# Patient Record
Sex: FEMALE | Race: ASIAN | NOT HISPANIC OR LATINO
[De-identification: names, ages, dates, MRNs, and addresses within clinical notes are randomized per-mention and may not be internally consistent; named-entity substitution may affect disease eponyms.]

---

## 2018-11-01 PROBLEM — Z00.00 ENCOUNTER FOR PREVENTIVE HEALTH EXAMINATION: Status: ACTIVE | Noted: 2018-11-01

## 2018-11-09 ENCOUNTER — APPOINTMENT (OUTPATIENT)
Dept: ORTHOPEDIC SURGERY | Facility: CLINIC | Age: 63
End: 2018-11-09
Payer: MEDICAID

## 2018-11-09 VITALS
HEART RATE: 53 BPM | HEIGHT: 57 IN | BODY MASS INDEX: 28.05 KG/M2 | DIASTOLIC BLOOD PRESSURE: 84 MMHG | WEIGHT: 130 LBS | SYSTOLIC BLOOD PRESSURE: 177 MMHG

## 2018-11-09 PROCEDURE — 20610 DRAIN/INJ JOINT/BURSA W/O US: CPT | Mod: 50

## 2018-11-09 PROCEDURE — 73562 X-RAY EXAM OF KNEE 3: CPT | Mod: 50

## 2018-11-09 PROCEDURE — 99204 OFFICE O/P NEW MOD 45 MIN: CPT | Mod: 25

## 2018-11-09 RX ORDER — FLUOXETINE HYDROCHLORIDE 40 MG/1
CAPSULE ORAL
Refills: 0 | Status: ACTIVE | COMMUNITY

## 2018-11-15 ENCOUNTER — APPOINTMENT (OUTPATIENT)
Dept: MRI IMAGING | Facility: CLINIC | Age: 63
End: 2018-11-15
Payer: MEDICAID

## 2018-11-15 ENCOUNTER — OUTPATIENT (OUTPATIENT)
Dept: OUTPATIENT SERVICES | Facility: HOSPITAL | Age: 63
LOS: 1 days | End: 2018-11-15

## 2018-11-15 DIAGNOSIS — M17.11 UNILATERAL PRIMARY OSTEOARTHRITIS, RIGHT KNEE: ICD-10-CM

## 2018-11-15 PROCEDURE — 73721 MRI JNT OF LWR EXTRE W/O DYE: CPT | Mod: 26,RT

## 2018-11-16 ENCOUNTER — APPOINTMENT (OUTPATIENT)
Dept: ORTHOPEDIC SURGERY | Facility: CLINIC | Age: 63
End: 2018-11-16
Payer: MEDICAID

## 2018-11-16 VITALS
BODY MASS INDEX: 28.05 KG/M2 | WEIGHT: 130 LBS | HEIGHT: 57 IN | SYSTOLIC BLOOD PRESSURE: 140 MMHG | DIASTOLIC BLOOD PRESSURE: 80 MMHG | HEART RATE: 64 BPM

## 2018-11-16 DIAGNOSIS — Z78.9 OTHER SPECIFIED HEALTH STATUS: ICD-10-CM

## 2018-11-16 DIAGNOSIS — M47.816 SPONDYLOSIS W/OUT MYELOPATHY OR RADICULOPATHY, LUMBAR REGION: ICD-10-CM

## 2018-11-16 DIAGNOSIS — M48.061 SPINAL STENOSIS, LUMBAR REGION WITHOUT NEUROGENIC CLAUDICATION: ICD-10-CM

## 2018-11-16 PROCEDURE — 72100 X-RAY EXAM L-S SPINE 2/3 VWS: CPT

## 2018-11-16 PROCEDURE — 99214 OFFICE O/P EST MOD 30 MIN: CPT

## 2018-11-16 RX ORDER — IBUPROFEN 600 MG/1
600 TABLET, FILM COATED ORAL TWICE DAILY
Qty: 30 | Refills: 0 | Status: ACTIVE | COMMUNITY
Start: 2018-11-16 | End: 1900-01-01

## 2018-11-16 RX ORDER — METHYLPREDNISOLONE 4 MG/1
4 TABLET ORAL
Qty: 1 | Refills: 0 | Status: ACTIVE | COMMUNITY
Start: 2018-11-16 | End: 1900-01-01

## 2018-11-21 ENCOUNTER — APPOINTMENT (OUTPATIENT)
Dept: INTERNAL MEDICINE | Facility: CLINIC | Age: 63
End: 2018-11-21

## 2019-04-17 ENCOUNTER — APPOINTMENT (OUTPATIENT)
Dept: ORTHOPEDIC SURGERY | Facility: CLINIC | Age: 64
End: 2019-04-17

## 2019-05-08 ENCOUNTER — APPOINTMENT (OUTPATIENT)
Dept: ORTHOPEDIC SURGERY | Facility: CLINIC | Age: 64
End: 2019-05-08
Payer: MEDICAID

## 2019-05-08 VITALS
DIASTOLIC BLOOD PRESSURE: 76 MMHG | HEIGHT: 57 IN | HEART RATE: 70 BPM | BODY MASS INDEX: 28.05 KG/M2 | WEIGHT: 130 LBS | SYSTOLIC BLOOD PRESSURE: 152 MMHG

## 2019-05-08 PROCEDURE — 99214 OFFICE O/P EST MOD 30 MIN: CPT

## 2019-05-08 NOTE — PHYSICAL EXAM
[LE] : Sensory: Intact in bilateral lower extremities [ALL] : dorsalis pedis, posterior tibial, femoral, popliteal, and radial 2+ and symmetric bilaterally [de-identified] : GENERAL APPEARANCE: Well nourished and hydrated, pleasant, alert, and oriented x 3. Appears their stated age. \par HEENT: Normocephalic, extraocular eye motion intact. Nasal septum midline. Oral cavity clear. External auditory canal clear. \par RESPIRATORY: Breath sounds clear and audible in all lobes. No wheezing, No accessory muscle use.\par CARDIOVASCULAR: No apparent abnormalities. No lower leg edema. No varicosities. Pedal pulses are palpable.\par NEUROLOGIC: Sensation is normal, no muscle weakness in the upper or lower extremities.\par DERMATOLOGIC: No apparent skin lesions, moist, warm, no rash.\par SPINE: Cervical spine appears normal and moves freely; thoracic spine appears normal and moves freely; lumbosacral spine appears normal and moves freely, normal, nontender.\par MUSCULOSKELETAL: Hands, wrists, and elbows are normal and move freely, shoulders are normal and move freely.  [de-identified] : Bilateral knee examination demonstrates medial joint line tenderness, 0-120 FROM, slight varus alignment, crepitus with ROM.\par  [de-identified] : MRI of the right knee performed 11/15/2018 at [facility] shows moderate tricompartmental osteoarthritis. Full-thickness tear of the posterior root insertion of the medial meniscus.

## 2019-05-08 NOTE — HISTORY OF PRESENT ILLNESS
[Pain Location] : pain [Worsening] : worsening [de-identified] : Patient is a 63 year old female who presents for follow evaluation of bilateral knee pain, right worse than left today, but this changes daily. She reports that her pain has worsened over the last few days. Her pain is localized to the anterior aspect of the knees. She notes crepitus with motion as well. She is using a brace and rest for relief, with mild relief. She was seen in November 2018, had cortisone injections which she states provided mild relief. After her last visit she was sent for MRI of the right knee. [6] : an average pain level of 6/10 [4] : a minimum pain level of 4/10 [8] : a maximum pain level of 8/10 [Walking] : walking [Daily] : ~He/She~ states the symptoms seem to be occuring daily [Bending] : worsened by bending [Direct Pressure] : worsened by direct pressure [Knee Flexion] : worsened with knee flexion [Knee Extension] : worsened with knee extension [Exercise Regimen] : relieved by exercise regimen [NSAIDs] : relieved by nonsteroidal anti-inflammatory drugs [Recumbency] : relieved by recumbency [Rest] : relieved by rest [Ataxia] : no ataxia [Loss of Dexterity] : good dexterity [Incontinence] : no incontinence [Urinary Ret.] : no urinary retention

## 2019-05-08 NOTE — ADDENDUM
[FreeTextEntry1] : I, Conor Vick, acted solely as a scribe for Dr. Kev Gomez on this date 05/08/2019.

## 2019-05-08 NOTE — DISCUSSION/SUMMARY
[Medication Risks Reviewed] : Medication risks reviewed [Surgical risks reviewed] : Surgical risks reviewed [de-identified] : 63 year old female presents with severe medial compartment osteoarthritis of the left knee and moderate tricompartmental osteoarthritis of the right knee. Her right knee MRI confirmed moderate osteoarthritis and full-thickness tear of the posterior root insertion of the medial meniscus. We discussed the nature of the condition and the treatment options. Based on the x-ray and physical exam, she is a candidate for left TKA. I believe this would be the best option for long term relief. She wishes to pursue left TKA, and we discussed the surgery in detail.\par \par She can schedule left TKA today in office, or can call to schedule.\par \par A knee replacement means resurfacing of all 3 surfaces of the patella, the femur, and tibia with metal and plastic parts. The prosthetic parts are usually cemented into position and well outpatient range of motion from full extension to about 120° of flexion. The postoperative motion, however, is determined by multiple factors, the most important of which is preoperative motion. In general, the better motion preoperatively, the better the motion postoperatively. The operation, pending medical clearance, gently requires hospitalization of 3-4 days for one knee, 5-7 days for bilateral knee replacements. In general, we prefer to perform the procedure under spinal anesthesia with femoral nerve block and occasional single shot sciatic nerve block. We may ask a patient to give 2 units of blood for bilateral total knee arthroplasties, for one knee we institute a normogram which may include administration of preoperative Procrit. The operative procedure takes probably 1-2 hours. The operation requires a straight incision anywhere from 5-7 inches down from the knee. Postoperatively, the patient is positioned in a continuous passive motion machine within the first 24 hours and is walking the day after surgery. The first couple days are very painful and the pain medication will alleviate, but not eliminate the pain. The patient must really push hard to get range of motion. Our goal for having a person go home as that the range of motion is approximately 0-90° of flexion and that they can walk with a walker or cane. A walking aid is to be dispensed once the patient is secure enough. In general there, there is no cast or brace required with routine knee replacement. In the long term, we do not encourage our patients to run for the sake of running, although pending their preoperative status, we often allow patient to play doubles tennis or comparative activities. We also allowed them to do gentle intermediate downhill skiing if they are truly an expert skier. Biking is encouraged as well swimming. The followup periods are usually 3 weeks, 6 weeks, 3 months, and yearly intervals. Potential complications with total knee replacement included anesthetic complications and death, infection around 1%, nerve damage, by which means peroneal nerve palsy, footdrop or flapping foot with ambulation. This is particularly more apt to occur in the patient with a valgus or knock-knee deformity. The incidence can be quite high in this particular patient population. There will be areas of skin numbness, but this is not an untoward effect nor do we consider it a complication. Other potential complications include dislocation of the patella component, usually less than 2%; loosening of the tibial or femoral component is much more infrequent. Most often this occurs with infection or long-term use. Patient of extreme risk including markedly overweight patient's may be more prone to prosthetic wear. Major blood vessel damage is also extremely rare. Directly because of the anatomic proximity of the popliteal artery this could be lacerated with subsequent repair required. Be it unlikely, disruption of the popliteal artery could theoretically result in amputation. Similarly, infection could theoretically result in amputation if one were to grow out of an organism that cannot be controlled with antibiotics. General medical complications include phlebitis, for which we would prophylactically anticoagulate patients, but could still occur, and fatal pulmonary embolus which has been reported. Cardiovascular problems, such as heart attack or ischemia are always a concern with such hemodynamic changes in the blood vascular system. Other General complications are rare, but anything medicine could theoretically happen. I think the patient understands the risk benefit ratio of total knee replacement and will think about whether there like to pursue with an operation or nonoperative treatment program.

## 2019-06-21 ENCOUNTER — APPOINTMENT (OUTPATIENT)
Dept: ORTHOPEDIC SURGERY | Facility: CLINIC | Age: 64
End: 2019-06-21
Payer: MEDICAID

## 2019-06-21 VITALS
HEIGHT: 57 IN | BODY MASS INDEX: 28.05 KG/M2 | WEIGHT: 130 LBS | DIASTOLIC BLOOD PRESSURE: 82 MMHG | SYSTOLIC BLOOD PRESSURE: 148 MMHG | HEART RATE: 72 BPM

## 2019-06-21 DIAGNOSIS — M17.11 UNILATERAL PRIMARY OSTEOARTHRITIS, RIGHT KNEE: ICD-10-CM

## 2019-06-21 DIAGNOSIS — M17.12 UNILATERAL PRIMARY OSTEOARTHRITIS, LEFT KNEE: ICD-10-CM

## 2019-06-21 PROCEDURE — 73562 X-RAY EXAM OF KNEE 3: CPT | Mod: LT

## 2019-06-21 PROCEDURE — 99204 OFFICE O/P NEW MOD 45 MIN: CPT

## 2019-06-21 RX ORDER — ACETAMINOPHEN AND CODEINE 300; 30 MG/1; MG/1
300-30 TABLET ORAL
Qty: 30 | Refills: 0 | Status: ACTIVE | COMMUNITY
Start: 2019-06-06

## 2019-06-21 RX ORDER — CYCLOBENZAPRINE HYDROCHLORIDE 5 MG/1
5 TABLET, FILM COATED ORAL
Qty: 30 | Refills: 0 | Status: ACTIVE | COMMUNITY
Start: 2019-06-06

## 2019-06-21 RX ORDER — VENLAFAXINE HYDROCHLORIDE 75 MG/1
75 CAPSULE, EXTENDED RELEASE ORAL
Qty: 30 | Refills: 0 | Status: ACTIVE | COMMUNITY
Start: 2019-06-02

## 2019-06-21 NOTE — DISCUSSION/SUMMARY
[Medication Risks Reviewed] : Medication risks reviewed [Surgical risks reviewed] : Surgical risks reviewed [de-identified] : After a thorough history full examination and review of x-rays. Was explained to the patient that she does suffer from severe DJD of both the left and right knee. She was explained the different modalities of treatment which include conservative management versus surgical intervention. However as she has had no success with conservative management. It is recommended that she undergo a left total knee replacement. This is the surgery that will be performed first as is the knee that she is complaining of more pain. She was explained the risks benefits pros and cons of surgery as well as alternatives. She was when it is no guarantee for complete relief following the procedure. It was also explained the actual procedure itself in great detail. I reviewed her plan of care as well as a model of the total knee implant to the one over use for the total knee replacement. I also explained about the postoperative management both in the hospital and upon discharge. As the patient also has some osteoarthritic changes in the right knee. Patient was offered a Visco supplementation injection can be performed at the date of surgery. She was in the risk benefits pros and cons of injection as well as alternatives to\par This is not appear over the osteoarthritis. If this patient does choose to undergo surgery. The patient would need to see his primary care physician for presurgical clearance. The patient agreed to the plan of care as well as the use of implants the left total knee replacement. And also agreed to be Visco supplementation injection. Should the patient be cleared for surgery. The patient will then speak to her surgical coordinator to arrange a date of surgery.

## 2019-06-21 NOTE — PHYSICAL EXAM
[Normal] : Gait: normal [LE] : Sensory: Intact in bilateral lower extremities [ALL] : dorsalis pedis, posterior tibial, femoral, popliteal, and radial 2+ and symmetric bilaterally [de-identified] : X-rays of both knees. Left is worse than right. She'll osteoarthritic changes in all 3 compartments. There is a flattening of the medial as well as lateral femoral condyles with marked osteophyte formation in all 3 compartments. There is also a narrowing of the joint space in particular the medial femoral tibial compartment worse in the left knee. There is no evidence of any fracture or dislocation noted. [de-identified] : On physical examination of both the left and right knee. Left is worse than the right. Skin is clean dry and intact without erythema swelling or heat noted. There is diffuse tenderness but really in the patellofemoral as well as medial femoral tibial compartment of both knees. The overall alignment is with the neutral position. The patient has excellent range of motion both passively and actively. She is able to bend both knees this one 25°. With full extension noted. There is no evidence of ligamentous laxity. As this was tested with anterior posterior drawer test and varus stress testing. There is no evidence of any muscle atrophy. No evidence of any motor or sensory deficit. Patient does have good distal pulses and no calf tenderness.

## 2019-06-21 NOTE — HISTORY OF PRESENT ILLNESS
[4] : an average pain level of 4/10 [Stable] : stable [de-identified] : The patient is a 63-year-old female who presents today for an evaluation of both the left and right knee. She describes the pain as a constant pain which is worse with standing and walking. She states the left is worse than the right. She tried conservative management including a good exercise program, NSAIDs as well as cortisone injections. She states that she did not receive any improvement except for a mild relief with the cortisone. She states that this lasted less than a month and the pain returned. She did seek medical attention in this office in the past it was explained she does have some osteoarthritic changes. She also follow with  that would advise her to undergo bilateral total knee replacements due to the arthritis. She states that as time has progressed she felt an increase in pain. Therefore she returns today for evaluation and management regarding both knees. Left knee is worse than the right. [Bending] : worsened by bending [Direct Pressure] : worsened by direct pressure [Walking] : worsened by walking

## 2019-06-26 ENCOUNTER — CHART COPY (OUTPATIENT)
Age: 64
End: 2019-06-26

## 2019-06-26 RX ORDER — HYALURONATE SOD, CROSS-LINKED 30 MG/3 ML
30 SYRINGE (ML) INTRAARTICULAR
Qty: 1 | Refills: 0 | Status: ACTIVE | OUTPATIENT
Start: 2019-06-26

## 2019-06-27 ENCOUNTER — OUTPATIENT (OUTPATIENT)
Dept: OUTPATIENT SERVICES | Facility: HOSPITAL | Age: 64
LOS: 1 days | End: 2019-06-27
Payer: MEDICAID

## 2019-06-27 VITALS
HEIGHT: 58 IN | SYSTOLIC BLOOD PRESSURE: 137 MMHG | DIASTOLIC BLOOD PRESSURE: 70 MMHG | HEART RATE: 65 BPM | RESPIRATION RATE: 15 BRPM | TEMPERATURE: 98 F | OXYGEN SATURATION: 96 % | WEIGHT: 128.09 LBS

## 2019-06-27 DIAGNOSIS — M17.12 UNILATERAL PRIMARY OSTEOARTHRITIS, LEFT KNEE: ICD-10-CM

## 2019-06-27 DIAGNOSIS — Z01.818 ENCOUNTER FOR OTHER PREPROCEDURAL EXAMINATION: ICD-10-CM

## 2019-06-27 DIAGNOSIS — M17.11 UNILATERAL PRIMARY OSTEOARTHRITIS, RIGHT KNEE: ICD-10-CM

## 2019-06-27 LAB
ALBUMIN SERPL ELPH-MCNC: 3.9 G/DL — SIGNIFICANT CHANGE UP (ref 3.3–5)
ALP SERPL-CCNC: 80 U/L — SIGNIFICANT CHANGE UP (ref 30–120)
ALT FLD-CCNC: 25 U/L DA — SIGNIFICANT CHANGE UP (ref 10–60)
ANION GAP SERPL CALC-SCNC: 4 MMOL/L — LOW (ref 5–17)
APTT BLD: 31.3 SEC — SIGNIFICANT CHANGE UP (ref 28.5–37)
AST SERPL-CCNC: 21 U/L — SIGNIFICANT CHANGE UP (ref 10–40)
BILIRUB SERPL-MCNC: 0.3 MG/DL — SIGNIFICANT CHANGE UP (ref 0.2–1.2)
BLD GP AB SCN SERPL QL: SIGNIFICANT CHANGE UP
BUN SERPL-MCNC: 11 MG/DL — SIGNIFICANT CHANGE UP (ref 7–23)
CALCIUM SERPL-MCNC: 9.2 MG/DL — SIGNIFICANT CHANGE UP (ref 8.4–10.5)
CHLORIDE SERPL-SCNC: 104 MMOL/L — SIGNIFICANT CHANGE UP (ref 96–108)
CO2 SERPL-SCNC: 33 MMOL/L — HIGH (ref 22–31)
CREAT SERPL-MCNC: 0.73 MG/DL — SIGNIFICANT CHANGE UP (ref 0.5–1.3)
GLUCOSE SERPL-MCNC: 87 MG/DL — SIGNIFICANT CHANGE UP (ref 70–99)
HCT VFR BLD CALC: 40.5 % — SIGNIFICANT CHANGE UP (ref 34.5–45)
HGB BLD-MCNC: 13.9 G/DL — SIGNIFICANT CHANGE UP (ref 11.5–15.5)
INR BLD: 0.94 RATIO — SIGNIFICANT CHANGE UP (ref 0.88–1.16)
MCHC RBC-ENTMCNC: 32.2 PG — SIGNIFICANT CHANGE UP (ref 27–34)
MCHC RBC-ENTMCNC: 34.3 GM/DL — SIGNIFICANT CHANGE UP (ref 32–36)
MCV RBC AUTO: 93.8 FL — SIGNIFICANT CHANGE UP (ref 80–100)
NRBC # BLD: 0 /100 WBCS — SIGNIFICANT CHANGE UP (ref 0–0)
PLATELET # BLD AUTO: 264 K/UL — SIGNIFICANT CHANGE UP (ref 150–400)
POTASSIUM SERPL-MCNC: 4.9 MMOL/L — SIGNIFICANT CHANGE UP (ref 3.5–5.3)
POTASSIUM SERPL-SCNC: 4.9 MMOL/L — SIGNIFICANT CHANGE UP (ref 3.5–5.3)
PROT SERPL-MCNC: 7.7 G/DL — SIGNIFICANT CHANGE UP (ref 6–8.3)
PROTHROM AB SERPL-ACNC: 10.2 SEC — SIGNIFICANT CHANGE UP (ref 10–12.9)
RBC # BLD: 4.32 M/UL — SIGNIFICANT CHANGE UP (ref 3.8–5.2)
RBC # FLD: 12.9 % — SIGNIFICANT CHANGE UP (ref 10.3–14.5)
SODIUM SERPL-SCNC: 141 MMOL/L — SIGNIFICANT CHANGE UP (ref 135–145)
WBC # BLD: 5.11 K/UL — SIGNIFICANT CHANGE UP (ref 3.8–10.5)
WBC # FLD AUTO: 5.11 K/UL — SIGNIFICANT CHANGE UP (ref 3.8–10.5)

## 2019-06-27 PROCEDURE — 85610 PROTHROMBIN TIME: CPT

## 2019-06-27 PROCEDURE — 36415 COLL VENOUS BLD VENIPUNCTURE: CPT

## 2019-06-27 PROCEDURE — 93005 ELECTROCARDIOGRAM TRACING: CPT

## 2019-06-27 PROCEDURE — 87640 STAPH A DNA AMP PROBE: CPT

## 2019-06-27 PROCEDURE — 86850 RBC ANTIBODY SCREEN: CPT

## 2019-06-27 PROCEDURE — 80053 COMPREHEN METABOLIC PANEL: CPT

## 2019-06-27 PROCEDURE — G0463: CPT

## 2019-06-27 PROCEDURE — 93010 ELECTROCARDIOGRAM REPORT: CPT

## 2019-06-27 PROCEDURE — 85027 COMPLETE CBC AUTOMATED: CPT

## 2019-06-27 PROCEDURE — 85730 THROMBOPLASTIN TIME PARTIAL: CPT

## 2019-06-27 PROCEDURE — 87641 MR-STAPH DNA AMP PROBE: CPT

## 2019-06-27 PROCEDURE — 86900 BLOOD TYPING SEROLOGIC ABO: CPT

## 2019-06-27 PROCEDURE — 86901 BLOOD TYPING SEROLOGIC RH(D): CPT

## 2019-06-27 RX ORDER — MUPIROCIN 20 MG/G
1 OINTMENT TOPICAL
Qty: 1 | Refills: 0
Start: 2019-06-27 | End: 2019-07-01

## 2019-06-27 NOTE — H&P PST ADULT - ASSESSMENT
62 yo female is scheduled for left total knee replacement /right knee replacement on 7/17/19 with Dr Palacio on 7/17/19 at [facility].

## 2019-06-27 NOTE — H&P PST ADULT - HISTORY OF PRESENT ILLNESS
64 yo female is scheduled for left total knee replacement / right knee injection on 7/17/19 with Dr Palacio at [facility].  Accompanied by her ffiend today, patient states she has bilateral knee pain with selling for which she has received cortisone injections without relief.  Pain worst with weight bearing and rates 10/10.

## 2019-06-27 NOTE — H&P PST ADULT - NSICDXPROBLEM_GEN_ALL_CORE_FT
PROBLEM DIAGNOSES  Problem: Osteoarthritis of right knee  Assessment and Plan: Left total knee replacement /Right knee injection  Pending medical clearance  Instructions reviewed with patient and friend  best wishes offered    Problem: Osteoarthritis of left knee  Assessment and Plan: see above

## 2019-06-27 NOTE — H&P PST ADULT - MUSCULOSKELETAL
details… detailed exam no joint warmth/no joint erythema/decreased ROM due to pain/no calf tenderness

## 2019-06-27 NOTE — H&P PST ADULT - NSANTHOSAYNRD_GEN_A_CORE
06-Jan-2017 22:02
No. JOHN screening performed.  STOP BANG Legend: 0-2 = LOW Risk; 3-4 = INTERMEDIATE Risk; 5-8 = HIGH Risk

## 2019-06-28 LAB
MRSA PCR RESULT.: SIGNIFICANT CHANGE UP
S AUREUS DNA NOSE QL NAA+PROBE: SIGNIFICANT CHANGE UP

## 2019-07-16 PROBLEM — M17.12 UNILATERAL PRIMARY OSTEOARTHRITIS, LEFT KNEE: Chronic | Status: ACTIVE | Noted: 2019-06-27

## 2019-07-16 PROBLEM — M17.11 UNILATERAL PRIMARY OSTEOARTHRITIS, RIGHT KNEE: Chronic | Status: ACTIVE | Noted: 2019-06-27

## 2019-07-16 PROBLEM — F32.9 MAJOR DEPRESSIVE DISORDER, SINGLE EPISODE, UNSPECIFIED: Chronic | Status: ACTIVE | Noted: 2019-06-27

## 2019-07-16 RX ORDER — MAGNESIUM HYDROXIDE 400 MG/1
30 TABLET, CHEWABLE ORAL DAILY
Refills: 0 | Status: DISCONTINUED | OUTPATIENT
Start: 2019-07-17 | End: 2019-07-22

## 2019-07-16 RX ORDER — SODIUM CHLORIDE 9 MG/ML
1000 INJECTION, SOLUTION INTRAVENOUS
Refills: 0 | Status: DISCONTINUED | OUTPATIENT
Start: 2019-07-17 | End: 2019-07-19

## 2019-07-16 RX ORDER — PANTOPRAZOLE SODIUM 20 MG/1
40 TABLET, DELAYED RELEASE ORAL
Refills: 0 | Status: DISCONTINUED | OUTPATIENT
Start: 2019-07-17 | End: 2019-07-22

## 2019-07-16 RX ORDER — SENNA PLUS 8.6 MG/1
2 TABLET ORAL AT BEDTIME
Refills: 0 | Status: DISCONTINUED | OUTPATIENT
Start: 2019-07-17 | End: 2019-07-22

## 2019-07-16 RX ORDER — POLYETHYLENE GLYCOL 3350 17 G/17G
17 POWDER, FOR SOLUTION ORAL DAILY
Refills: 0 | Status: DISCONTINUED | OUTPATIENT
Start: 2019-07-17 | End: 2019-07-22

## 2019-07-16 RX ORDER — ONDANSETRON 8 MG/1
4 TABLET, FILM COATED ORAL EVERY 6 HOURS
Refills: 0 | Status: DISCONTINUED | OUTPATIENT
Start: 2019-07-17 | End: 2019-07-22

## 2019-07-16 RX ORDER — DOCUSATE SODIUM 100 MG
100 CAPSULE ORAL THREE TIMES A DAY
Refills: 0 | Status: DISCONTINUED | OUTPATIENT
Start: 2019-07-17 | End: 2019-07-22

## 2019-07-17 ENCOUNTER — INPATIENT (INPATIENT)
Facility: HOSPITAL | Age: 64
LOS: 4 days | Discharge: HOME | DRG: 470 | End: 2019-07-22
Attending: ORTHOPAEDIC SURGERY | Admitting: ORTHOPAEDIC SURGERY
Payer: MEDICAID

## 2019-07-17 ENCOUNTER — RESULT REVIEW (OUTPATIENT)
Age: 64
End: 2019-07-17

## 2019-07-17 ENCOUNTER — APPOINTMENT (OUTPATIENT)
Dept: ORTHOPEDIC SURGERY | Facility: HOSPITAL | Age: 64
End: 2019-07-17

## 2019-07-17 VITALS
DIASTOLIC BLOOD PRESSURE: 66 MMHG | HEART RATE: 55 BPM | TEMPERATURE: 98 F | OXYGEN SATURATION: 99 % | RESPIRATION RATE: 25 BRPM | WEIGHT: 131.4 LBS | SYSTOLIC BLOOD PRESSURE: 135 MMHG | HEIGHT: 57 IN

## 2019-07-17 DIAGNOSIS — M17.12 UNILATERAL PRIMARY OSTEOARTHRITIS, LEFT KNEE: ICD-10-CM

## 2019-07-17 PROCEDURE — 99223 1ST HOSP IP/OBS HIGH 75: CPT

## 2019-07-17 PROCEDURE — 73562 X-RAY EXAM OF KNEE 3: CPT | Mod: 26,LT

## 2019-07-17 PROCEDURE — 88305 TISSUE EXAM BY PATHOLOGIST: CPT | Mod: 26

## 2019-07-17 PROCEDURE — 88311 DECALCIFY TISSUE: CPT | Mod: 26

## 2019-07-17 PROCEDURE — 27447 TOTAL KNEE ARTHROPLASTY: CPT | Mod: LT

## 2019-07-17 RX ORDER — VANCOMYCIN HCL 1 G
750 VIAL (EA) INTRAVENOUS ONCE
Refills: 0 | Status: COMPLETED | OUTPATIENT
Start: 2019-07-17 | End: 2019-07-17

## 2019-07-17 RX ORDER — ACETAMINOPHEN 500 MG
1000 TABLET ORAL EVERY 8 HOURS
Refills: 0 | Status: DISCONTINUED | OUTPATIENT
Start: 2019-07-18 | End: 2019-07-22

## 2019-07-17 RX ORDER — TRANEXAMIC ACID 100 MG/ML
1000 INJECTION, SOLUTION INTRAVENOUS ONCE
Refills: 0 | Status: COMPLETED | OUTPATIENT
Start: 2019-07-17 | End: 2019-07-17

## 2019-07-17 RX ORDER — ACETAMINOPHEN 500 MG
1000 TABLET ORAL ONCE
Refills: 0 | Status: COMPLETED | OUTPATIENT
Start: 2019-07-17 | End: 2019-07-17

## 2019-07-17 RX ORDER — CELECOXIB 200 MG/1
200 CAPSULE ORAL
Refills: 0 | Status: DISCONTINUED | OUTPATIENT
Start: 2019-07-17 | End: 2019-07-22

## 2019-07-17 RX ORDER — OXYCODONE HYDROCHLORIDE 5 MG/1
10 TABLET ORAL
Refills: 0 | Status: DISCONTINUED | OUTPATIENT
Start: 2019-07-17 | End: 2019-07-19

## 2019-07-17 RX ORDER — CHLORHEXIDINE GLUCONATE 213 G/1000ML
1 SOLUTION TOPICAL ONCE
Refills: 0 | Status: COMPLETED | OUTPATIENT
Start: 2019-07-17 | End: 2019-07-17

## 2019-07-17 RX ORDER — HYDROMORPHONE HYDROCHLORIDE 2 MG/ML
0.5 INJECTION INTRAMUSCULAR; INTRAVENOUS; SUBCUTANEOUS
Refills: 0 | Status: DISCONTINUED | OUTPATIENT
Start: 2019-07-17 | End: 2019-07-17

## 2019-07-17 RX ORDER — ACETAMINOPHEN 500 MG
1000 TABLET ORAL EVERY 6 HOURS
Refills: 0 | Status: COMPLETED | OUTPATIENT
Start: 2019-07-17 | End: 2019-07-18

## 2019-07-17 RX ORDER — ONDANSETRON 8 MG/1
4 TABLET, FILM COATED ORAL ONCE
Refills: 0 | Status: DISCONTINUED | OUTPATIENT
Start: 2019-07-17 | End: 2019-07-17

## 2019-07-17 RX ORDER — APREPITANT 80 MG/1
40 CAPSULE ORAL ONCE
Refills: 0 | Status: COMPLETED | OUTPATIENT
Start: 2019-07-17 | End: 2019-07-17

## 2019-07-17 RX ORDER — VENLAFAXINE HCL 75 MG
75 CAPSULE, EXT RELEASE 24 HR ORAL DAILY
Refills: 0 | Status: DISCONTINUED | OUTPATIENT
Start: 2019-07-17 | End: 2019-07-22

## 2019-07-17 RX ORDER — ASPIRIN/CALCIUM CARB/MAGNESIUM 324 MG
81 TABLET ORAL EVERY 12 HOURS
Refills: 0 | Status: DISCONTINUED | OUTPATIENT
Start: 2019-07-18 | End: 2019-07-19

## 2019-07-17 RX ORDER — HYDROMORPHONE HYDROCHLORIDE 2 MG/ML
0.5 INJECTION INTRAMUSCULAR; INTRAVENOUS; SUBCUTANEOUS
Refills: 0 | Status: DISCONTINUED | OUTPATIENT
Start: 2019-07-17 | End: 2019-07-22

## 2019-07-17 RX ORDER — OXYCODONE HYDROCHLORIDE 5 MG/1
5 TABLET ORAL
Refills: 0 | Status: DISCONTINUED | OUTPATIENT
Start: 2019-07-17 | End: 2019-07-19

## 2019-07-17 RX ORDER — SODIUM CHLORIDE 9 MG/ML
1000 INJECTION, SOLUTION INTRAVENOUS
Refills: 0 | Status: DISCONTINUED | OUTPATIENT
Start: 2019-07-17 | End: 2019-07-17

## 2019-07-17 RX ADMIN — Medication 250 MILLIGRAM(S): at 21:26

## 2019-07-17 RX ADMIN — Medication 250 MILLIGRAM(S): at 11:35

## 2019-07-17 RX ADMIN — APREPITANT 40 MILLIGRAM(S): 80 CAPSULE ORAL at 11:36

## 2019-07-17 RX ADMIN — CHLORHEXIDINE GLUCONATE 1 APPLICATION(S): 213 SOLUTION TOPICAL at 11:36

## 2019-07-17 RX ADMIN — CELECOXIB 200 MILLIGRAM(S): 200 CAPSULE ORAL at 21:25

## 2019-07-17 RX ADMIN — Medication 100 MILLIGRAM(S): at 21:25

## 2019-07-17 RX ADMIN — Medication 1000 MILLIGRAM(S): at 20:46

## 2019-07-17 RX ADMIN — CELECOXIB 200 MILLIGRAM(S): 200 CAPSULE ORAL at 21:27

## 2019-07-17 RX ADMIN — Medication 400 MILLIGRAM(S): at 19:54

## 2019-07-17 RX ADMIN — SODIUM CHLORIDE 100 MILLILITER(S): 9 INJECTION, SOLUTION INTRAVENOUS at 19:54

## 2019-07-17 NOTE — CONSULT NOTE ADULT - ASSESSMENT
Aftercare following     pain meds dilaudid and hydromorphone. Moniter for respiratory depression and lethargy.  PT/OT.  DVT prophylaxis.  DVT ppx: [x ]ASA81 [ ] [id number] [ ] Lovenox [ ] Coumadin   [ ] Eliquis [  ] Heparin  Dispo:     Home [ ]     Acute Rehab [ ]     [facility] [ ]     TBD [x ]    OA bothKnees    Depression  Effexor. Aftercare following     pain meds dilaudid and hydromorphone. Moniter for respiratory depression and lethargy.  PT/OT.  DVT prophylaxis.  DVT ppx: [x ]ASA81 [ ] ZCF935 [ ] Lovenox [ ] Coumadin   [ ] Eliquis [  ] Heparin  Dispo:     Home [ ]     Acute Rehab [ ]     [facility] [ ]     TBD [x ]    OA bothKnees    Depression  Effexor.    Plan of care was discuss with patient, all questions were answered, seems understand and in agreement.

## 2019-07-17 NOTE — BRIEF OPERATIVE NOTE - NSICDXBRIEFPROCEDURE_GEN_ALL_CORE_FT
PROCEDURES:  Examination of knee with anesthesia and injection of cortisone 17-Jul-2019 15:42:40  Kev Arnett

## 2019-07-17 NOTE — PHYSICAL THERAPY INITIAL EVALUATION ADULT - ADDITIONAL COMMENTS
rents room, has no help  has 4 luisa and 13 steps to room  has no dme  will need rehab due to no help at home

## 2019-07-17 NOTE — BRIEF OPERATIVE NOTE - NSICDXBRIEFPREOP_GEN_ALL_CORE_FT
PRE-OP DIAGNOSIS:  Osteoarthritis of right knee 17-Jul-2019 15:42:58  Kev Arnett
PRE-OP DIAGNOSIS:  Osteoarthritis of left knee 17-Jul-2019 15:39:03  Kev Arnett

## 2019-07-17 NOTE — CONSULT NOTE ADULT - SUBJECTIVE AND OBJECTIVE BOX
Patient is a 63y old  Female who presents with a chief complaint of   64 yo female is scheduled for left total knee replacement / right knee injection on 7/17/19 with Dr Palacio at [facility].  Accompanied by her ffiend today, patient states she has bilateral knee pain with selling for which she has received cortisone injections without relief.  Pain worst with weight bearing and rates 10/10.   HPI:  Patient is seen and examined.    PAST MEDICAL & SURGICAL HISTORY:  Depression  Osteoarthritis of right knee  Osteoarthritis of left knee  No significant past surgical history      MEDICATIONS  (STANDING):  lactated ringers. 1000 milliLiter(s) (100 mL/Hr) IV Continuous <Continuous>    MEDICATIONS  (PRN):  HYDROmorphone  Injectable 0.5 milliGRAM(s) IV Push every 10 minutes PRN Moderate Pain (4 - 6)  ondansetron Injectable 4 milliGRAM(s) IV Push once PRN Nausea and/or Vomiting      Allergies    penicillin (Unknown)    Intolerances    SOCIAL HISTORY:  Smoker:  YES / NO        PACK YEARS:                         WHEN QUIT?  ETOH use:  YES / NO               FREQUENCY / QUANTITY:  Ilicit Drug use:  YES / NO  Occupation:  Assisted device use (Cane / Walker):  Live with:      FAMILY HISTORY:  No pertinent family history in first degree relatives      Vital Signs Last 24 Hrs  T(C): 36.5 (17 Jul 2019 11:44), Max: 36.5 (17 Jul 2019 11:44)  T(F): 97.7 (17 Jul 2019 11:44), Max: 97.7 (17 Jul 2019 11:44)  HR: 55 (17 Jul 2019 13:25) (51 - 55)  BP: 121/55 (17 Jul 2019 13:25) (120/55 - 135/66)  BP(mean): --  RR: 16 (17 Jul 2019 13:25) (16 - 25)  SpO2: 96% (17 Jul 2019 13:25) (95% - 99%) Patient is a 63y old  Female who presents with a chief complaint of   64 yo female is scheduled for left total knee replacement / right knee injection on 7/17/19 with Dr Palacio at [facility].  Accompanied by her ffiend today, patient states she has bilateral knee pain with selling for which she has received cortisone injections without relief.  Pain worst with weight bearing and rates 10/10.   HPI:  Patient is seen and examined.  Pain is controlled.    PAST MEDICAL & SURGICAL HISTORY:  Depression  Osteoarthritis of right knee  Osteoarthritis of left knee  No significant past surgical history      MEDICATIONS  (STANDING):  lactated ringers. 1000 milliLiter(s) (100 mL/Hr) IV Continuous <Continuous>    MEDICATIONS  (PRN):  HYDROmorphone  Injectable 0.5 milliGRAM(s) IV Push every 10 minutes PRN Moderate Pain (4 - 6)  ondansetron Injectable 4 milliGRAM(s) IV Push once PRN Nausea and/or Vomiting      Allergies    penicillin (Unknown)    Intolerances    SOCIAL HISTORY:  Smoker:   NO        PACK YEARS:                         WHEN QUIT?  ETOH use:  NO               FREQUENCY / QUANTITY:  Ilicit Drug use:  YES /canabis        FAMILY HISTORY:  No pertinent family history in first degree relatives      Vital Signs Last 24 Hrs  T(C): 36.5 (17 Jul 2019 11:44), Max: 36.5 (17 Jul 2019 11:44)  T(F): 97.7 (17 Jul 2019 11:44), Max: 97.7 (17 Jul 2019 11:44)  HR: 55 (17 Jul 2019 13:25) (51 - 55)  BP: 121/55 (17 Jul 2019 13:25) (120/55 - 135/66)  BP(mean): --  RR: 16 (17 Jul 2019 13:25) (16 - 25)  SpO2: 96% (17 Jul 2019 13:25) (95% - 99%)

## 2019-07-17 NOTE — BRIEF OPERATIVE NOTE - NSICDXBRIEFPOSTOP_GEN_ALL_CORE_FT
POST-OP DIAGNOSIS:  Primary osteoarthritis of left knee 17-Jul-2019 15:39:13  Kev Arnett
POST-OP DIAGNOSIS:  Osteoarthritis of right knee 17-Jul-2019 15:43:08  Kev Arnett

## 2019-07-18 ENCOUNTER — TRANSCRIPTION ENCOUNTER (OUTPATIENT)
Age: 64
End: 2019-07-18

## 2019-07-18 LAB
ANION GAP SERPL CALC-SCNC: 8 MMOL/L — SIGNIFICANT CHANGE UP (ref 5–17)
BUN SERPL-MCNC: 9 MG/DL — SIGNIFICANT CHANGE UP (ref 7–23)
CALCIUM SERPL-MCNC: 8.9 MG/DL — SIGNIFICANT CHANGE UP (ref 8.4–10.5)
CHLORIDE SERPL-SCNC: 106 MMOL/L — SIGNIFICANT CHANGE UP (ref 96–108)
CO2 SERPL-SCNC: 27 MMOL/L — SIGNIFICANT CHANGE UP (ref 22–31)
CREAT SERPL-MCNC: 0.88 MG/DL — SIGNIFICANT CHANGE UP (ref 0.5–1.3)
GLUCOSE SERPL-MCNC: 126 MG/DL — HIGH (ref 70–99)
HCT VFR BLD CALC: 40.1 % — SIGNIFICANT CHANGE UP (ref 34.5–45)
HGB BLD-MCNC: 13.4 G/DL — SIGNIFICANT CHANGE UP (ref 11.5–15.5)
MCHC RBC-ENTMCNC: 31.2 PG — SIGNIFICANT CHANGE UP (ref 27–34)
MCHC RBC-ENTMCNC: 33.4 GM/DL — SIGNIFICANT CHANGE UP (ref 32–36)
MCV RBC AUTO: 93.5 FL — SIGNIFICANT CHANGE UP (ref 80–100)
NRBC # BLD: 0 /100 WBCS — SIGNIFICANT CHANGE UP (ref 0–0)
PLATELET # BLD AUTO: 250 K/UL — SIGNIFICANT CHANGE UP (ref 150–400)
POTASSIUM SERPL-MCNC: 5.2 MMOL/L — SIGNIFICANT CHANGE UP (ref 3.5–5.3)
POTASSIUM SERPL-SCNC: 5.2 MMOL/L — SIGNIFICANT CHANGE UP (ref 3.5–5.3)
RBC # BLD: 4.29 M/UL — SIGNIFICANT CHANGE UP (ref 3.8–5.2)
RBC # FLD: 12.6 % — SIGNIFICANT CHANGE UP (ref 10.3–14.5)
SODIUM SERPL-SCNC: 141 MMOL/L — SIGNIFICANT CHANGE UP (ref 135–145)
WBC # BLD: 9.98 K/UL — SIGNIFICANT CHANGE UP (ref 3.8–10.5)
WBC # FLD AUTO: 9.98 K/UL — SIGNIFICANT CHANGE UP (ref 3.8–10.5)

## 2019-07-18 PROCEDURE — 99232 SBSQ HOSP IP/OBS MODERATE 35: CPT

## 2019-07-18 RX ORDER — VENLAFAXINE HCL 75 MG
1 CAPSULE, EXT RELEASE 24 HR ORAL
Qty: 0 | Refills: 0 | DISCHARGE

## 2019-07-18 RX ADMIN — CELECOXIB 200 MILLIGRAM(S): 200 CAPSULE ORAL at 08:29

## 2019-07-18 RX ADMIN — Medication 81 MILLIGRAM(S): at 17:21

## 2019-07-18 RX ADMIN — Medication 100 MILLIGRAM(S): at 21:26

## 2019-07-18 RX ADMIN — Medication 100 MILLIGRAM(S): at 06:05

## 2019-07-18 RX ADMIN — OXYCODONE HYDROCHLORIDE 10 MILLIGRAM(S): 5 TABLET ORAL at 12:02

## 2019-07-18 RX ADMIN — OXYCODONE HYDROCHLORIDE 10 MILLIGRAM(S): 5 TABLET ORAL at 22:00

## 2019-07-18 RX ADMIN — Medication 1000 MILLIGRAM(S): at 02:16

## 2019-07-18 RX ADMIN — Medication 400 MILLIGRAM(S): at 02:00

## 2019-07-18 RX ADMIN — Medication 1000 MILLIGRAM(S): at 14:45

## 2019-07-18 RX ADMIN — OXYCODONE HYDROCHLORIDE 5 MILLIGRAM(S): 5 TABLET ORAL at 08:33

## 2019-07-18 RX ADMIN — OXYCODONE HYDROCHLORIDE 10 MILLIGRAM(S): 5 TABLET ORAL at 14:45

## 2019-07-18 RX ADMIN — SODIUM CHLORIDE 100 MILLILITER(S): 9 INJECTION, SOLUTION INTRAVENOUS at 11:34

## 2019-07-18 RX ADMIN — PANTOPRAZOLE SODIUM 40 MILLIGRAM(S): 20 TABLET, DELAYED RELEASE ORAL at 06:05

## 2019-07-18 RX ADMIN — Medication 81 MILLIGRAM(S): at 06:05

## 2019-07-18 RX ADMIN — Medication 75 MILLIGRAM(S): at 11:32

## 2019-07-18 RX ADMIN — Medication 400 MILLIGRAM(S): at 08:29

## 2019-07-18 RX ADMIN — Medication 1000 MILLIGRAM(S): at 21:27

## 2019-07-18 RX ADMIN — CELECOXIB 200 MILLIGRAM(S): 200 CAPSULE ORAL at 21:26

## 2019-07-18 RX ADMIN — OXYCODONE HYDROCHLORIDE 10 MILLIGRAM(S): 5 TABLET ORAL at 18:52

## 2019-07-18 RX ADMIN — OXYCODONE HYDROCHLORIDE 5 MILLIGRAM(S): 5 TABLET ORAL at 09:03

## 2019-07-18 RX ADMIN — OXYCODONE HYDROCHLORIDE 10 MILLIGRAM(S): 5 TABLET ORAL at 11:32

## 2019-07-18 RX ADMIN — OXYCODONE HYDROCHLORIDE 10 MILLIGRAM(S): 5 TABLET ORAL at 21:26

## 2019-07-18 RX ADMIN — Medication 1000 MILLIGRAM(S): at 08:29

## 2019-07-18 RX ADMIN — OXYCODONE HYDROCHLORIDE 10 MILLIGRAM(S): 5 TABLET ORAL at 15:15

## 2019-07-18 RX ADMIN — Medication 100 MILLIGRAM(S): at 14:45

## 2019-07-18 RX ADMIN — OXYCODONE HYDROCHLORIDE 10 MILLIGRAM(S): 5 TABLET ORAL at 18:22

## 2019-07-18 NOTE — DISCHARGE NOTE PROVIDER - CARE PROVIDER_API CALL
Lakshmi Palacio)  Orthopedics  [address], [address]  [place] [address]  Phone: [phone]  Fax: [phone]  Follow Up Time:

## 2019-07-18 NOTE — DISCHARGE NOTE PROVIDER - NSDCCPCAREPLAN_GEN_ALL_CORE_FT
PRINCIPAL DISCHARGE DIAGNOSIS  Diagnosis: Osteoarthritis of left knee  Assessment and Plan of Treatment: Physical Therapy/Occupational Therapy for ambulation, transfers, stairs, ADL's, Range of Motion Exercises, Isometrics.  Full weight bearing as tolerated with rolling walker  Range of Motion Goals: Flexion 120 degrees; Extension 0 degrees  Keep incision clean and dry.  Suture/prineo dressing removal 14 days after surgery at rehab facility or Surgeon's office  May shower post-op day #5 if no drainage from incision PRINCIPAL DISCHARGE DIAGNOSIS  Diagnosis: Osteoarthritis of left knee  Assessment and Plan of Treatment: Physical Therapy/Occupational Therapy for ambulation, transfers, stairs, ADL's, Range of Motion Exercises, Isometrics.  Full weight bearing as tolerated with rolling walker  Range of Motion Goals: Flexion 120 degrees; Extension 0 degrees  Keep incision clean and dry.  Suture/prineo dressing removal 14 days after surgery at rehab facility or Surgeon's office  May shower post-op day #5 if no drainage from incision  For DVT prophylaxis-Take Eliquis 2.5mg twice daily for 12 days after surgery then Aspirin 81mg twice daily for 4 weeks after.

## 2019-07-18 NOTE — OCCUPATIONAL THERAPY INITIAL EVALUATION ADULT - ADDITIONAL COMMENTS
rents room, has no help  has 4 luisa and 13 steps to room  has no dme rents room, has no help  has 4 LIDIA partial HR, and 13 steps 1 HR to room, + stall shower  has no dme

## 2019-07-18 NOTE — OCCUPATIONAL THERAPY INITIAL EVALUATION ADULT - ANTICIPATED DISCHARGE DISPOSITION, OT EVAL
lives in rented room +17 steps , pt states he has no help/rehabilitation facility rehabilitation facility/lives in rented room +17 steps , pt states she has no help

## 2019-07-18 NOTE — DISCHARGE NOTE PROVIDER - HOSPITAL COURSE
This patient was admitted to [facility] with a history of severe degenerative joint disease of the left knee.  Patient went to Pre-Surgical Testing at [facility] and was medically cleared to undergo elective procedure. Left TKR performed on 7/17/19 by Dr. Palacio. No operative or arsen-operative complications arose during patients hospital course.  Patient received antibiotic according to SCIP guidelines for infection prevention.  Ecotrin was given for DVT prophylaxis.  Anesthesia, Medical Hospitalist, Physical Therapy and Occupational Therapy were consulted. Patient is stable for discharge with a good prognosis.  Appropriate discharge instructions and medications are provided in this document. This patient was admitted to [facility] with a history of severe degenerative joint disease of the left knee.  Patient went to Pre-Surgical Testing at [facility] and was medically cleared to undergo elective procedure. Left TKR performed on 7/17/19 by Dr. Palacio. No operative or arsen-operative complications arose during patients hospital course.  Patient received antibiotic according to SCIP guidelines for infection prevention.  Ecotrin was given for DVT prophylaxis.  Anesthesia, Medical Hospitalist, Physical Therapy and Occupational Therapy were consulted. Patient is stable for discharge with a good prognosis.  Appropriate discharge instructions and medications are provided in this document.        Patient seen and examined.  Agree with above.  Home health care arranaged for outpatient PT.                 PHYSICAL EXAM:    Vital Signs Last 24 Hrs    T(C): 36.7 (22 Jul 2019 07:33), Max: 36.7 (21 Jul 2019 23:20)    T(F): 98.1 (22 Jul 2019 07:33), Max: 98.1 (21 Jul 2019 23:20)    HR: 59 (22 Jul 2019 07:33) (59 - 67)    BP: 129/70 (22 Jul 2019 07:33) (102/53 - 129/70)    BP(mean): --    RR: 17 (22 Jul 2019 07:33) (16 - 17)    SpO2: 97% (22 Jul 2019 07:33) (97% - 97%)        GENERAL: NAD, well-groomed, well-developed    HEAD:  Atraumatic, Normocephalic    EYES: EOMI, PERRLA, conjunctiva and sclera clear    ENMT: No tonsillar erythema, exudates, or enlargement; Moist mucous membranes, Good dentition, No lesions    NECK: Supple, No JVD, Normal thyroid    NERVOUS SYSTEM:  Alert & Oriented X3, Good concentration; Motor Strength 5/5 B/L upper and lower extremities; CHEST/LUNG: Clear to auscultation bilaterally; No rales, rhonchi, wheezing, or rubs    HEART: Regular rate and rhythm; No murmurs, rubs, or gallops    ABDOMEN: Soft, Nontender, Nondistended; Bowel sounds present    EXTREMITIES:  2+ Peripheral Pulses, No clubbing, cyanosis, or edema    LYMPH: No lymphadenopathy noted    SKIN: No rashes or lesions

## 2019-07-18 NOTE — DISCHARGE NOTE PROVIDER - NSDCACTIVITY_GEN_ALL_CORE
No heavy lifting/straining/Showering allowed/Do not make important decisions/Walking - Indoors allowed/Stairs allowed

## 2019-07-18 NOTE — DISCHARGE NOTE PROVIDER - INSTRUCTIONS
For Constipation :   • Increase your water intake. Drink at least 8 glasses of water daily.  • Try adding fiber to your diet by eating fruits, vegetables and foods that are rich in grains.  • If you do experience constipation, you may take an over-the-counter stool softener/laxative such as Alva Colace, Senekot or  Milk of Magnesia.

## 2019-07-18 NOTE — DISCHARGE NOTE PROVIDER - NSDCFUSCHEDAPPT_GEN_ALL_CORE_FT
NI LEYVAOC ; 08/06/2019 ; [facility] NI LEYVAOC ; 08/06/2019 ; [facility] NI LEYVAOC ; 08/06/2019 ; [facility] NI LEYVAOC ; 08/06/2019 ; [facility] NI LEYVAOC ; 08/06/2019 ; [facility] NI LEYVAOC ; 08/06/2019 ; [facility] NI LEYVAOC ; 08/06/2019 ; [facility]

## 2019-07-19 LAB
ANION GAP SERPL CALC-SCNC: 6 MMOL/L — SIGNIFICANT CHANGE UP (ref 5–17)
BUN SERPL-MCNC: 16 MG/DL — SIGNIFICANT CHANGE UP (ref 7–23)
CALCIUM SERPL-MCNC: 8.6 MG/DL — SIGNIFICANT CHANGE UP (ref 8.4–10.5)
CHLORIDE SERPL-SCNC: 104 MMOL/L — SIGNIFICANT CHANGE UP (ref 96–108)
CO2 SERPL-SCNC: 30 MMOL/L — SIGNIFICANT CHANGE UP (ref 22–31)
CREAT SERPL-MCNC: 0.84 MG/DL — SIGNIFICANT CHANGE UP (ref 0.5–1.3)
GLUCOSE SERPL-MCNC: 96 MG/DL — SIGNIFICANT CHANGE UP (ref 70–99)
HCT VFR BLD CALC: 38.2 % — SIGNIFICANT CHANGE UP (ref 34.5–45)
HGB BLD-MCNC: 12.5 G/DL — SIGNIFICANT CHANGE UP (ref 11.5–15.5)
MCHC RBC-ENTMCNC: 30.8 PG — SIGNIFICANT CHANGE UP (ref 27–34)
MCHC RBC-ENTMCNC: 32.7 GM/DL — SIGNIFICANT CHANGE UP (ref 32–36)
MCV RBC AUTO: 94.1 FL — SIGNIFICANT CHANGE UP (ref 80–100)
NRBC # BLD: 0 /100 WBCS — SIGNIFICANT CHANGE UP (ref 0–0)
PLATELET # BLD AUTO: 228 K/UL — SIGNIFICANT CHANGE UP (ref 150–400)
POTASSIUM SERPL-MCNC: 3.9 MMOL/L — SIGNIFICANT CHANGE UP (ref 3.5–5.3)
POTASSIUM SERPL-MCNC: 5.4 MMOL/L — HIGH (ref 3.5–5.3)
POTASSIUM SERPL-SCNC: 3.9 MMOL/L — SIGNIFICANT CHANGE UP (ref 3.5–5.3)
POTASSIUM SERPL-SCNC: 5.4 MMOL/L — HIGH (ref 3.5–5.3)
RBC # BLD: 4.06 M/UL — SIGNIFICANT CHANGE UP (ref 3.8–5.2)
RBC # FLD: 12.9 % — SIGNIFICANT CHANGE UP (ref 10.3–14.5)
SODIUM SERPL-SCNC: 140 MMOL/L — SIGNIFICANT CHANGE UP (ref 135–145)
WBC # BLD: 9.3 K/UL — SIGNIFICANT CHANGE UP (ref 3.8–10.5)
WBC # FLD AUTO: 9.3 K/UL — SIGNIFICANT CHANGE UP (ref 3.8–10.5)

## 2019-07-19 PROCEDURE — 99233 SBSQ HOSP IP/OBS HIGH 50: CPT

## 2019-07-19 RX ORDER — OXYCODONE HYDROCHLORIDE 5 MG/1
15 TABLET ORAL
Refills: 0 | Status: DISCONTINUED | OUTPATIENT
Start: 2019-07-19 | End: 2019-07-22

## 2019-07-19 RX ORDER — ACETAMINOPHEN WITH CODEINE 300MG-30MG
0 TABLET ORAL
Qty: 0 | Refills: 0 | DISCHARGE

## 2019-07-19 RX ORDER — ACETAMINOPHEN 500 MG
2 TABLET ORAL
Qty: 0 | Refills: 0 | DISCHARGE
Start: 2019-07-19

## 2019-07-19 RX ORDER — OXYCODONE HYDROCHLORIDE 5 MG/1
1 TABLET ORAL
Qty: 0 | Refills: 0 | DISCHARGE
Start: 2019-07-19

## 2019-07-19 RX ORDER — SENNA PLUS 8.6 MG/1
2 TABLET ORAL
Qty: 0 | Refills: 0 | DISCHARGE
Start: 2019-07-19

## 2019-07-19 RX ORDER — ASPIRIN/CALCIUM CARB/MAGNESIUM 324 MG
1 TABLET ORAL
Qty: 0 | Refills: 0 | DISCHARGE
Start: 2019-07-19

## 2019-07-19 RX ORDER — PANTOPRAZOLE SODIUM 20 MG/1
1 TABLET, DELAYED RELEASE ORAL
Qty: 0 | Refills: 0 | DISCHARGE
Start: 2019-07-19

## 2019-07-19 RX ORDER — APIXABAN 2.5 MG/1
1 TABLET, FILM COATED ORAL
Qty: 0 | Refills: 0 | DISCHARGE
Start: 2019-07-19

## 2019-07-19 RX ORDER — CELECOXIB 200 MG/1
1 CAPSULE ORAL
Qty: 0 | Refills: 0 | DISCHARGE
Start: 2019-07-19

## 2019-07-19 RX ORDER — APIXABAN 2.5 MG/1
2.5 TABLET, FILM COATED ORAL EVERY 12 HOURS
Refills: 0 | Status: DISCONTINUED | OUTPATIENT
Start: 2019-07-19 | End: 2019-07-22

## 2019-07-19 RX ORDER — DOCUSATE SODIUM 100 MG
1 CAPSULE ORAL
Qty: 0 | Refills: 0 | DISCHARGE
Start: 2019-07-19

## 2019-07-19 RX ORDER — OXYCODONE HYDROCHLORIDE 5 MG/1
10 TABLET ORAL
Refills: 0 | Status: DISCONTINUED | OUTPATIENT
Start: 2019-07-19 | End: 2019-07-22

## 2019-07-19 RX ADMIN — Medication 75 MILLIGRAM(S): at 11:42

## 2019-07-19 RX ADMIN — CELECOXIB 200 MILLIGRAM(S): 200 CAPSULE ORAL at 08:19

## 2019-07-19 RX ADMIN — Medication 100 MILLIGRAM(S): at 05:08

## 2019-07-19 RX ADMIN — Medication 81 MILLIGRAM(S): at 05:08

## 2019-07-19 RX ADMIN — OXYCODONE HYDROCHLORIDE 15 MILLIGRAM(S): 5 TABLET ORAL at 09:04

## 2019-07-19 RX ADMIN — Medication 1000 MILLIGRAM(S): at 21:47

## 2019-07-19 RX ADMIN — POLYETHYLENE GLYCOL 3350 17 GRAM(S): 17 POWDER, FOR SOLUTION ORAL at 11:43

## 2019-07-19 RX ADMIN — PANTOPRAZOLE SODIUM 40 MILLIGRAM(S): 20 TABLET, DELAYED RELEASE ORAL at 05:08

## 2019-07-19 RX ADMIN — OXYCODONE HYDROCHLORIDE 15 MILLIGRAM(S): 5 TABLET ORAL at 11:42

## 2019-07-19 RX ADMIN — OXYCODONE HYDROCHLORIDE 15 MILLIGRAM(S): 5 TABLET ORAL at 12:12

## 2019-07-19 RX ADMIN — OXYCODONE HYDROCHLORIDE 15 MILLIGRAM(S): 5 TABLET ORAL at 15:14

## 2019-07-19 RX ADMIN — OXYCODONE HYDROCHLORIDE 15 MILLIGRAM(S): 5 TABLET ORAL at 21:47

## 2019-07-19 RX ADMIN — Medication 1000 MILLIGRAM(S): at 13:09

## 2019-07-19 RX ADMIN — SENNA PLUS 2 TABLET(S): 8.6 TABLET ORAL at 21:47

## 2019-07-19 RX ADMIN — OXYCODONE HYDROCHLORIDE 15 MILLIGRAM(S): 5 TABLET ORAL at 22:10

## 2019-07-19 RX ADMIN — CELECOXIB 200 MILLIGRAM(S): 200 CAPSULE ORAL at 21:46

## 2019-07-19 RX ADMIN — Medication 100 MILLIGRAM(S): at 11:42

## 2019-07-19 RX ADMIN — OXYCODONE HYDROCHLORIDE 15 MILLIGRAM(S): 5 TABLET ORAL at 08:34

## 2019-07-19 RX ADMIN — Medication 100 MILLIGRAM(S): at 21:47

## 2019-07-19 RX ADMIN — Medication 30 MILLILITER(S): at 05:08

## 2019-07-19 RX ADMIN — APIXABAN 2.5 MILLIGRAM(S): 2.5 TABLET, FILM COATED ORAL at 21:47

## 2019-07-19 RX ADMIN — CELECOXIB 200 MILLIGRAM(S): 200 CAPSULE ORAL at 08:18

## 2019-07-19 RX ADMIN — Medication 1000 MILLIGRAM(S): at 13:10

## 2019-07-19 RX ADMIN — Medication 1000 MILLIGRAM(S): at 05:08

## 2019-07-19 RX ADMIN — OXYCODONE HYDROCHLORIDE 15 MILLIGRAM(S): 5 TABLET ORAL at 14:44

## 2019-07-20 LAB
ANION GAP SERPL CALC-SCNC: 4 MMOL/L — LOW (ref 5–17)
BUN SERPL-MCNC: 17 MG/DL — SIGNIFICANT CHANGE UP (ref 7–23)
CALCIUM SERPL-MCNC: 8.5 MG/DL — SIGNIFICANT CHANGE UP (ref 8.4–10.5)
CHLORIDE SERPL-SCNC: 102 MMOL/L — SIGNIFICANT CHANGE UP (ref 96–108)
CO2 SERPL-SCNC: 32 MMOL/L — HIGH (ref 22–31)
CREAT SERPL-MCNC: 0.81 MG/DL — SIGNIFICANT CHANGE UP (ref 0.5–1.3)
GLUCOSE SERPL-MCNC: 96 MG/DL — SIGNIFICANT CHANGE UP (ref 70–99)
HCT VFR BLD CALC: 37.2 % — SIGNIFICANT CHANGE UP (ref 34.5–45)
HGB BLD-MCNC: 12.4 G/DL — SIGNIFICANT CHANGE UP (ref 11.5–15.5)
MCHC RBC-ENTMCNC: 31.1 PG — SIGNIFICANT CHANGE UP (ref 27–34)
MCHC RBC-ENTMCNC: 33.3 GM/DL — SIGNIFICANT CHANGE UP (ref 32–36)
MCV RBC AUTO: 93.2 FL — SIGNIFICANT CHANGE UP (ref 80–100)
NRBC # BLD: 0 /100 WBCS — SIGNIFICANT CHANGE UP (ref 0–0)
PLATELET # BLD AUTO: 216 K/UL — SIGNIFICANT CHANGE UP (ref 150–400)
POTASSIUM SERPL-MCNC: 4.6 MMOL/L — SIGNIFICANT CHANGE UP (ref 3.5–5.3)
POTASSIUM SERPL-SCNC: 4.6 MMOL/L — SIGNIFICANT CHANGE UP (ref 3.5–5.3)
RBC # BLD: 3.99 M/UL — SIGNIFICANT CHANGE UP (ref 3.8–5.2)
RBC # FLD: 12.8 % — SIGNIFICANT CHANGE UP (ref 10.3–14.5)
SODIUM SERPL-SCNC: 138 MMOL/L — SIGNIFICANT CHANGE UP (ref 135–145)
WBC # BLD: 6.38 K/UL — SIGNIFICANT CHANGE UP (ref 3.8–10.5)
WBC # FLD AUTO: 6.38 K/UL — SIGNIFICANT CHANGE UP (ref 3.8–10.5)

## 2019-07-20 PROCEDURE — 99233 SBSQ HOSP IP/OBS HIGH 50: CPT

## 2019-07-20 RX ADMIN — Medication 1000 MILLIGRAM(S): at 12:44

## 2019-07-20 RX ADMIN — CELECOXIB 200 MILLIGRAM(S): 200 CAPSULE ORAL at 21:35

## 2019-07-20 RX ADMIN — OXYCODONE HYDROCHLORIDE 15 MILLIGRAM(S): 5 TABLET ORAL at 10:50

## 2019-07-20 RX ADMIN — OXYCODONE HYDROCHLORIDE 15 MILLIGRAM(S): 5 TABLET ORAL at 09:55

## 2019-07-20 RX ADMIN — APIXABAN 2.5 MILLIGRAM(S): 2.5 TABLET, FILM COATED ORAL at 09:57

## 2019-07-20 RX ADMIN — PANTOPRAZOLE SODIUM 40 MILLIGRAM(S): 20 TABLET, DELAYED RELEASE ORAL at 05:34

## 2019-07-20 RX ADMIN — CELECOXIB 200 MILLIGRAM(S): 200 CAPSULE ORAL at 09:57

## 2019-07-20 RX ADMIN — Medication 100 MILLIGRAM(S): at 12:41

## 2019-07-20 RX ADMIN — Medication 100 MILLIGRAM(S): at 05:33

## 2019-07-20 RX ADMIN — Medication 1000 MILLIGRAM(S): at 05:33

## 2019-07-20 RX ADMIN — Medication 1000 MILLIGRAM(S): at 21:35

## 2019-07-20 RX ADMIN — Medication 100 MILLIGRAM(S): at 21:35

## 2019-07-20 RX ADMIN — Medication 30 MILLILITER(S): at 05:33

## 2019-07-20 RX ADMIN — Medication 1000 MILLIGRAM(S): at 12:41

## 2019-07-20 RX ADMIN — APIXABAN 2.5 MILLIGRAM(S): 2.5 TABLET, FILM COATED ORAL at 21:35

## 2019-07-20 RX ADMIN — CELECOXIB 200 MILLIGRAM(S): 200 CAPSULE ORAL at 09:54

## 2019-07-20 RX ADMIN — POLYETHYLENE GLYCOL 3350 17 GRAM(S): 17 POWDER, FOR SOLUTION ORAL at 12:40

## 2019-07-20 RX ADMIN — Medication 75 MILLIGRAM(S): at 12:40

## 2019-07-21 LAB
ANION GAP SERPL CALC-SCNC: 1 MMOL/L — LOW (ref 5–17)
BASOPHILS # BLD AUTO: 0.02 K/UL — SIGNIFICANT CHANGE UP (ref 0–0.2)
BASOPHILS NFR BLD AUTO: 0.4 % — SIGNIFICANT CHANGE UP (ref 0–2)
BUN SERPL-MCNC: 16 MG/DL — SIGNIFICANT CHANGE UP (ref 7–23)
CALCIUM SERPL-MCNC: 8.9 MG/DL — SIGNIFICANT CHANGE UP (ref 8.4–10.5)
CHLORIDE SERPL-SCNC: 105 MMOL/L — SIGNIFICANT CHANGE UP (ref 96–108)
CO2 SERPL-SCNC: 35 MMOL/L — HIGH (ref 22–31)
CREAT SERPL-MCNC: 0.7 MG/DL — SIGNIFICANT CHANGE UP (ref 0.5–1.3)
EOSINOPHIL # BLD AUTO: 0.36 K/UL — SIGNIFICANT CHANGE UP (ref 0–0.5)
EOSINOPHIL NFR BLD AUTO: 6.8 % — HIGH (ref 0–6)
GLUCOSE SERPL-MCNC: 106 MG/DL — HIGH (ref 70–99)
HCT VFR BLD CALC: 38.2 % — SIGNIFICANT CHANGE UP (ref 34.5–45)
HGB BLD-MCNC: 12.6 G/DL — SIGNIFICANT CHANGE UP (ref 11.5–15.5)
IMM GRANULOCYTES NFR BLD AUTO: 0.4 % — SIGNIFICANT CHANGE UP (ref 0–1.5)
LYMPHOCYTES # BLD AUTO: 1.52 K/UL — SIGNIFICANT CHANGE UP (ref 1–3.3)
LYMPHOCYTES # BLD AUTO: 28.6 % — SIGNIFICANT CHANGE UP (ref 13–44)
MCHC RBC-ENTMCNC: 31 PG — SIGNIFICANT CHANGE UP (ref 27–34)
MCHC RBC-ENTMCNC: 33 GM/DL — SIGNIFICANT CHANGE UP (ref 32–36)
MCV RBC AUTO: 93.9 FL — SIGNIFICANT CHANGE UP (ref 80–100)
MONOCYTES # BLD AUTO: 0.57 K/UL — SIGNIFICANT CHANGE UP (ref 0–0.9)
MONOCYTES NFR BLD AUTO: 10.7 % — SIGNIFICANT CHANGE UP (ref 2–14)
NEUTROPHILS # BLD AUTO: 2.82 K/UL — SIGNIFICANT CHANGE UP (ref 1.8–7.4)
NEUTROPHILS NFR BLD AUTO: 53.1 % — SIGNIFICANT CHANGE UP (ref 43–77)
NRBC # BLD: 0 /100 WBCS — SIGNIFICANT CHANGE UP (ref 0–0)
PLATELET # BLD AUTO: 223 K/UL — SIGNIFICANT CHANGE UP (ref 150–400)
POTASSIUM SERPL-MCNC: 5.1 MMOL/L — SIGNIFICANT CHANGE UP (ref 3.5–5.3)
POTASSIUM SERPL-SCNC: 5.1 MMOL/L — SIGNIFICANT CHANGE UP (ref 3.5–5.3)
RBC # BLD: 4.07 M/UL — SIGNIFICANT CHANGE UP (ref 3.8–5.2)
RBC # FLD: 12.9 % — SIGNIFICANT CHANGE UP (ref 10.3–14.5)
SODIUM SERPL-SCNC: 141 MMOL/L — SIGNIFICANT CHANGE UP (ref 135–145)
WBC # BLD: 5.31 K/UL — SIGNIFICANT CHANGE UP (ref 3.8–10.5)
WBC # FLD AUTO: 5.31 K/UL — SIGNIFICANT CHANGE UP (ref 3.8–10.5)

## 2019-07-21 PROCEDURE — 99233 SBSQ HOSP IP/OBS HIGH 50: CPT

## 2019-07-21 RX ADMIN — Medication 1000 MILLIGRAM(S): at 12:27

## 2019-07-21 RX ADMIN — PANTOPRAZOLE SODIUM 40 MILLIGRAM(S): 20 TABLET, DELAYED RELEASE ORAL at 05:50

## 2019-07-21 RX ADMIN — Medication 100 MILLIGRAM(S): at 12:25

## 2019-07-21 RX ADMIN — OXYCODONE HYDROCHLORIDE 15 MILLIGRAM(S): 5 TABLET ORAL at 09:20

## 2019-07-21 RX ADMIN — Medication 1000 MILLIGRAM(S): at 21:16

## 2019-07-21 RX ADMIN — Medication 1000 MILLIGRAM(S): at 05:51

## 2019-07-21 RX ADMIN — APIXABAN 2.5 MILLIGRAM(S): 2.5 TABLET, FILM COATED ORAL at 21:15

## 2019-07-21 RX ADMIN — Medication 100 MILLIGRAM(S): at 21:15

## 2019-07-21 RX ADMIN — CELECOXIB 200 MILLIGRAM(S): 200 CAPSULE ORAL at 08:22

## 2019-07-21 RX ADMIN — CELECOXIB 200 MILLIGRAM(S): 200 CAPSULE ORAL at 08:24

## 2019-07-21 RX ADMIN — CELECOXIB 200 MILLIGRAM(S): 200 CAPSULE ORAL at 21:16

## 2019-07-21 RX ADMIN — Medication 100 MILLIGRAM(S): at 05:50

## 2019-07-21 RX ADMIN — OXYCODONE HYDROCHLORIDE 15 MILLIGRAM(S): 5 TABLET ORAL at 08:22

## 2019-07-21 RX ADMIN — CELECOXIB 200 MILLIGRAM(S): 200 CAPSULE ORAL at 21:15

## 2019-07-21 RX ADMIN — Medication 75 MILLIGRAM(S): at 12:25

## 2019-07-21 RX ADMIN — Medication 1000 MILLIGRAM(S): at 12:24

## 2019-07-21 RX ADMIN — POLYETHYLENE GLYCOL 3350 17 GRAM(S): 17 POWDER, FOR SOLUTION ORAL at 12:27

## 2019-07-21 RX ADMIN — Medication 1000 MILLIGRAM(S): at 21:15

## 2019-07-21 RX ADMIN — APIXABAN 2.5 MILLIGRAM(S): 2.5 TABLET, FILM COATED ORAL at 08:23

## 2019-07-22 ENCOUNTER — TRANSCRIPTION ENCOUNTER (OUTPATIENT)
Age: 64
End: 2019-07-22

## 2019-07-22 VITALS
DIASTOLIC BLOOD PRESSURE: 66 MMHG | SYSTOLIC BLOOD PRESSURE: 132 MMHG | HEART RATE: 64 BPM | TEMPERATURE: 98 F | RESPIRATION RATE: 16 BRPM | OXYGEN SATURATION: 99 %

## 2019-07-22 PROCEDURE — 88305 TISSUE EXAM BY PATHOLOGIST: CPT

## 2019-07-22 PROCEDURE — C1889: CPT

## 2019-07-22 PROCEDURE — 85014 HEMATOCRIT: CPT

## 2019-07-22 PROCEDURE — 97161 PT EVAL LOW COMPLEX 20 MIN: CPT

## 2019-07-22 PROCEDURE — 85018 HEMOGLOBIN: CPT

## 2019-07-22 PROCEDURE — 36415 COLL VENOUS BLD VENIPUNCTURE: CPT

## 2019-07-22 PROCEDURE — 99239 HOSP IP/OBS DSCHRG MGMT >30: CPT

## 2019-07-22 PROCEDURE — 97116 GAIT TRAINING THERAPY: CPT

## 2019-07-22 PROCEDURE — C1713: CPT

## 2019-07-22 PROCEDURE — 97110 THERAPEUTIC EXERCISES: CPT

## 2019-07-22 PROCEDURE — 73562 X-RAY EXAM OF KNEE 3: CPT

## 2019-07-22 PROCEDURE — 88311 DECALCIFY TISSUE: CPT

## 2019-07-22 PROCEDURE — 80048 BASIC METABOLIC PNL TOTAL CA: CPT

## 2019-07-22 PROCEDURE — 97530 THERAPEUTIC ACTIVITIES: CPT

## 2019-07-22 PROCEDURE — C1776: CPT

## 2019-07-22 PROCEDURE — 85027 COMPLETE CBC AUTOMATED: CPT

## 2019-07-22 PROCEDURE — 97165 OT EVAL LOW COMPLEX 30 MIN: CPT

## 2019-07-22 PROCEDURE — 97535 SELF CARE MNGMENT TRAINING: CPT

## 2019-07-22 PROCEDURE — 84132 ASSAY OF SERUM POTASSIUM: CPT

## 2019-07-22 RX ORDER — OXYCODONE HYDROCHLORIDE 5 MG/1
1 TABLET ORAL
Qty: 42 | Refills: 0
Start: 2019-07-22 | End: 2019-07-28

## 2019-07-22 RX ORDER — CELECOXIB 200 MG/1
1 CAPSULE ORAL
Qty: 60 | Refills: 0
Start: 2019-07-22 | End: 2019-08-20

## 2019-07-22 RX ORDER — APIXABAN 2.5 MG/1
1 TABLET, FILM COATED ORAL
Qty: 18 | Refills: 0
Start: 2019-07-22 | End: 2019-07-30

## 2019-07-22 RX ORDER — OXYCODONE HYDROCHLORIDE 5 MG/1
1 TABLET ORAL
Qty: 56 | Refills: 0
Start: 2019-07-22 | End: 2019-07-28

## 2019-07-22 RX ORDER — PANTOPRAZOLE SODIUM 20 MG/1
1 TABLET, DELAYED RELEASE ORAL
Qty: 30 | Refills: 1
Start: 2019-07-22

## 2019-07-22 RX ORDER — MELOXICAM 15 MG/1
1 TABLET ORAL
Qty: 30 | Refills: 0
Start: 2019-07-22 | End: 2019-08-20

## 2019-07-22 RX ADMIN — OXYCODONE HYDROCHLORIDE 15 MILLIGRAM(S): 5 TABLET ORAL at 13:13

## 2019-07-22 RX ADMIN — OXYCODONE HYDROCHLORIDE 15 MILLIGRAM(S): 5 TABLET ORAL at 16:40

## 2019-07-22 RX ADMIN — Medication 1000 MILLIGRAM(S): at 15:21

## 2019-07-22 RX ADMIN — POLYETHYLENE GLYCOL 3350 17 GRAM(S): 17 POWDER, FOR SOLUTION ORAL at 12:35

## 2019-07-22 RX ADMIN — OXYCODONE HYDROCHLORIDE 15 MILLIGRAM(S): 5 TABLET ORAL at 12:43

## 2019-07-22 RX ADMIN — APIXABAN 2.5 MILLIGRAM(S): 2.5 TABLET, FILM COATED ORAL at 09:00

## 2019-07-22 RX ADMIN — Medication 1000 MILLIGRAM(S): at 05:23

## 2019-07-22 RX ADMIN — Medication 1000 MILLIGRAM(S): at 15:22

## 2019-07-22 RX ADMIN — Medication 1000 MILLIGRAM(S): at 05:22

## 2019-07-22 RX ADMIN — Medication 100 MILLIGRAM(S): at 05:22

## 2019-07-22 RX ADMIN — Medication 100 MILLIGRAM(S): at 15:21

## 2019-07-22 RX ADMIN — CELECOXIB 200 MILLIGRAM(S): 200 CAPSULE ORAL at 09:00

## 2019-07-22 RX ADMIN — OXYCODONE HYDROCHLORIDE 15 MILLIGRAM(S): 5 TABLET ORAL at 08:46

## 2019-07-22 RX ADMIN — PANTOPRAZOLE SODIUM 40 MILLIGRAM(S): 20 TABLET, DELAYED RELEASE ORAL at 05:22

## 2019-07-22 RX ADMIN — OXYCODONE HYDROCHLORIDE 15 MILLIGRAM(S): 5 TABLET ORAL at 08:16

## 2019-07-22 RX ADMIN — CELECOXIB 200 MILLIGRAM(S): 200 CAPSULE ORAL at 09:02

## 2019-07-22 RX ADMIN — Medication 75 MILLIGRAM(S): at 12:35

## 2019-07-22 RX ADMIN — Medication 30 MILLILITER(S): at 08:17

## 2019-07-22 RX ADMIN — OXYCODONE HYDROCHLORIDE 15 MILLIGRAM(S): 5 TABLET ORAL at 16:10

## 2019-07-22 NOTE — PROGRESS NOTE ADULT - ASSESSMENT
63F with osteoarthritis, depression here for left knee surgery.  S/P surgery on 7/17.      Left knee surgery  - DVT ppx and pain control as per ortho/anesthesia  - PT/OT  - advance diet as tolerated  - anti-emetics PRN  - incentive spirometer  - bowel regiment    Depression  - cont with effexor    Preventive measure  - on eliquis as per ortho protocol
63F with osteoarthritis, depression here for left knee surgery.  S/P surgery on 7/17.      Left knee surgery  - DVT ppx and pain control as per ortho/anesthesia  - PT/OT  - advance diet as tolerated  - anti-emetics PRN  - incentive spirometer  - bowel regiment    Depression  - cont with effexor    Preventive measure  - on eliquis as per ortho protocol    Dispo  - possible home vs rehab tomorrow
Neurovascular status in tact
Aftercare following left TKR 7/17.    pain meds dilaudid and hydromorphone. Moniter for respiratory depression and lethargy.  PT/OT.  DVT prophylaxis.  DVT ppx: [x ]ASA81 [ ] [id number] [ ] Lovenox [ ] Coumadin   [ ] Eliquis [  ] Heparin  Dispo:     Home [ ]     Acute Rehab [ ]     [facility] [x ]     TBD [ ]    OA both Knees    Depression  Effexor.    Hyperkalemia  check stat K level.    Plan of care was discuss with patient, all questions were answered, seems understand and in agreement.
Aftercare following     pain meds dilaudid and hydromorphone. Moniter for respiratory depression and lethargy.  PT/OT.  DVT prophylaxis.  DVT ppx: [x ]ASA81 [ ] [id number] [ ] Lovenox [ ] Coumadin   [ ] Eliquis [  ] Heparin  Dispo:     Home [ ]     Acute Rehab [ ]     [facility] [ ]     TBD [x ]    OA bothKnees    Depression  Effexor.    Plan of care was discuss with patient, all questions were answered, seems understand and in agreement.

## 2019-07-22 NOTE — PROGRESS NOTE ADULT - PROVIDER SPECIALTY LIST ADULT
Hospitalist
Orthopedics
Pharmacy
Hospitalist

## 2019-07-22 NOTE — DISCHARGE NOTE NURSING/CASE MANAGEMENT/SOCIAL WORK - NSSCCONTNUM_GEN_ALL_CORE
Please contact the home care agency at  [phone] if you have not heard from them by 12 noon on the day after your hospital discharge.

## 2019-07-22 NOTE — DISCHARGE NOTE NURSING/CASE MANAGEMENT/SOCIAL WORK - NSDCDPATPORTLINK_GEN_ALL_CORE
You can access the [facility] Portal, offered by [facility], by registering with the following website: [email or web address]

## 2019-07-22 NOTE — PROGRESS NOTE ADULT - SUBJECTIVE AND OBJECTIVE BOX
Patient is a 63y old  Female who presents with a chief complaint of Left TKR (18 Jul 2019 12:44)      INTERVAL HPI/OVERNIGHT EVENTS:  Pt is seen and examined.  Pain is controlled.  sitting on a chair.    Pain Location & Control:     MEDICATIONS  (STANDING):  acetaminophen   Tablet .. 1000 milliGRAM(s) Oral every 8 hours  aspirin enteric coated 81 milliGRAM(s) Oral every 12 hours  celecoxib 200 milliGRAM(s) Oral two times a day  docusate sodium 100 milliGRAM(s) Oral three times a day  lactated ringers. 1000 milliLiter(s) (100 mL/Hr) IV Continuous <Continuous>  pantoprazole    Tablet 40 milliGRAM(s) Oral before breakfast  polyethylene glycol 3350 17 Gram(s) Oral daily  venlafaxine 75 milliGRAM(s) Oral daily    MEDICATIONS  (PRN):  aluminum hydroxide/magnesium hydroxide/simethicone Suspension 30 milliLiter(s) Oral four times a day PRN Indigestion  bisacodyl Suppository 10 milliGRAM(s) Rectal daily PRN If no bowel movement by postoperative day #2  HYDROmorphone  Injectable 0.5 milliGRAM(s) IV Push every 3 hours PRN Severe Pain (7 - 10)  magnesium hydroxide Suspension 30 milliLiter(s) Oral daily PRN Constipation  ondansetron Injectable 4 milliGRAM(s) IV Push every 6 hours PRN Nausea and/or Vomiting  oxyCODONE    IR 10 milliGRAM(s) Oral every 3 hours PRN Mild Pain (1 - 3)  oxyCODONE    IR 15 milliGRAM(s) Oral every 3 hours PRN Moderate Pain (4 - 6)  senna 2 Tablet(s) Oral at bedtime PRN Constipation      Allergies    penicillin (Unknown)    Intolerances      Vital Signs Last 24 Hrs  T(C): 36.5 (19 Jul 2019 07:17), Max: 37 (18 Jul 2019 19:30)  T(F): 97.7 (19 Jul 2019 07:17), Max: 98.6 (18 Jul 2019 19:30)  HR: 64 (19 Jul 2019 07:17) (56 - 77)  BP: 143/79 (19 Jul 2019 07:17) (110/66 - 143/79)  BP(mean): --  RR: 16 (19 Jul 2019 07:17) (15 - 18)  SpO2: 96% (19 Jul 2019 07:17) (95% - 98%)        LABS:                        12.5   9.30  )-----------( 228      ( 19 Jul 2019 07:13 )             38.2     19 Jul 2019 07:13    140    |  104    |  16     ----------------------------<  96     5.4     |  30     |  0.84     Ca    8.6        19 Jul 2019 07:13        RADIOLOGY & ADDITIONAL TESTS:    Imaging Personally Reviewed:  [ ] YES  [ ] NO    Consultant(s) Notes Reviewed:  [ ] YES  [ ] NO    Care Discussed with Consultants/Other Providers [x ] YES  [ ] NO
Discharge medication calendar:  Aspirin EC 81mg q12h x 6 weeks  APAP 1000mg q8h x 2-3 weeks  Celecoxib 200mg q12h x 2-3 weeks  Pantoprazole 40mg QAM x 6 weeks  Narcotic PRN  Docusate 100mg TID while taking narcotic  Miralax, Senna, or Bisacodyl PRN for treatment of constipation
INTERVAL HPI/OVERNIGHT EVENTS:  No acute events overnight.  Has some pain in the left knee.  Was a 6/7 out 10 while working with PT.  Now 4/5 while sitting in her bed.  Denied any nausea/vomiting.  Tolerating PO.  No other complaints.       MEDICATIONS  (STANDING):  acetaminophen   Tablet .. 1000 milliGRAM(s) Oral every 8 hours  apixaban 2.5 milliGRAM(s) Oral every 12 hours  celecoxib 200 milliGRAM(s) Oral two times a day  docusate sodium 100 milliGRAM(s) Oral three times a day  pantoprazole    Tablet 40 milliGRAM(s) Oral before breakfast  polyethylene glycol 3350 17 Gram(s) Oral daily  venlafaxine 75 milliGRAM(s) Oral daily    MEDICATIONS  (PRN):  aluminum hydroxide/magnesium hydroxide/simethicone Suspension 30 milliLiter(s) Oral four times a day PRN Indigestion  bisacodyl Suppository 10 milliGRAM(s) Rectal daily PRN If no bowel movement by postoperative day #2  HYDROmorphone  Injectable 0.5 milliGRAM(s) IV Push every 3 hours PRN Severe Pain (7 - 10)  magnesium hydroxide Suspension 30 milliLiter(s) Oral daily PRN Constipation  ondansetron Injectable 4 milliGRAM(s) IV Push every 6 hours PRN Nausea and/or Vomiting  oxyCODONE    IR 10 milliGRAM(s) Oral every 3 hours PRN Mild Pain (1 - 3)  oxyCODONE    IR 15 milliGRAM(s) Oral every 3 hours PRN Moderate Pain (4 - 6)  senna 2 Tablet(s) Oral at bedtime PRN Constipation      Allergies  penicillin (Unknown)    REVIEW OF SYSTEMS:  as above, rest of ROS negative        PHYSICAL EXAM:  Vital Signs Last 24 Hrs  T(C): 36.7 (20 Jul 2019 07:54), Max: 36.9 (19 Jul 2019 23:45)  T(F): 98 (20 Jul 2019 07:54), Max: 98.5 (19 Jul 2019 23:45)  HR: 57 (20 Jul 2019 07:54) (57 - 64)  BP: 113/72 (20 Jul 2019 07:54) (108/63 - 133/76)  BP(mean): --  RR: 16 (20 Jul 2019 07:54) (16 - 17)  SpO2: 97% (20 Jul 2019 07:54) (97% - 98%)    GENERAL:     NAD, well-groomed, well-developed  HEAD:     atraumatic, normocephalic  EYES:     EOMI, conjunctiva and sclera clear  ENMT:     no tonsillar erythema or exudates or enlargement, no oral lesions, moist mucous membranes, good dentition  NECK:     supple, no JVD  RESPIRATORY:     clear to auscultation bilaterally, no rales or rhonchi or wheezing or rubs  CARDIOVASCULAR:     regular rate and rhythm, no murmurs or rubs or gallops, 2+ peripheral pulses  GASTROINTESTINAL:     soft, nontender, nondistended, no hepatosplenomegaly palpated, bowel sounds present  EXTREMITIES:     no clubbing or cyanosis or edema  MUSCULOSKELETAL:     +left knee pain  NERVOUS SYSTEM:     motor strength intact with 5/5 B/L upper and lower extremities, no gross sensory deficits  SKIN:     no rashes or lesions  PSYCH:     appropriate, alert and orientated x3, good concentration      LABS:                        12.4   6.38  )-----------( 216      ( 20 Jul 2019 07:03 )             37.2     20 Jul 2019 07:03    138    |  102    |  17     ----------------------------<  96     4.6     |  32     |  0.81     Ca    8.5        20 Jul 2019 07:03      CAPILLARY BLOOD GLUCOSE    Consultant(s) Notes Reviewed:  [X] YES  [ ] NO    Care Discussed with Consultants/Other Providers [X] YES  [ ] NO
INTERVAL HPI/OVERNIGHT EVENTS:  No acute events overnight.  Patient states she is improving, feeling better and stronger everyday.  Denies any nausea/vomiting.  Tolerating PO.  No reports of any diarrhea.  Requesting to go home tomorrow instead of rehab.      MEDICATIONS  (STANDING):  acetaminophen   Tablet .. 1000 milliGRAM(s) Oral every 8 hours  apixaban 2.5 milliGRAM(s) Oral every 12 hours  celecoxib 200 milliGRAM(s) Oral two times a day  docusate sodium 100 milliGRAM(s) Oral three times a day  pantoprazole    Tablet 40 milliGRAM(s) Oral before breakfast  polyethylene glycol 3350 17 Gram(s) Oral daily  venlafaxine 75 milliGRAM(s) Oral daily    MEDICATIONS  (PRN):  aluminum hydroxide/magnesium hydroxide/simethicone Suspension 30 milliLiter(s) Oral four times a day PRN Indigestion  bisacodyl Suppository 10 milliGRAM(s) Rectal daily PRN If no bowel movement by postoperative day #2  HYDROmorphone  Injectable 0.5 milliGRAM(s) IV Push every 3 hours PRN Severe Pain (7 - 10)  magnesium hydroxide Suspension 30 milliLiter(s) Oral daily PRN Constipation  ondansetron Injectable 4 milliGRAM(s) IV Push every 6 hours PRN Nausea and/or Vomiting  oxyCODONE    IR 10 milliGRAM(s) Oral every 3 hours PRN Mild Pain (1 - 3)  oxyCODONE    IR 15 milliGRAM(s) Oral every 3 hours PRN Moderate Pain (4 - 6)  senna 2 Tablet(s) Oral at bedtime PRN Constipation      Allergies  penicillin (Unknown)    Vital Signs Last 24 Hrs  T(C): 36.5 (21 Jul 2019 07:40), Max: 36.7 (20 Jul 2019 23:00)  T(F): 97.7 (21 Jul 2019 07:40), Max: 98.1 (20 Jul 2019 23:00)  HR: 59 (21 Jul 2019 07:40) (59 - 67)  BP: 148/74 (21 Jul 2019 07:40) (116/63 - 148/74)  BP(mean): --  RR: 16 (21 Jul 2019 07:40) (16 - 16)  SpO2: 98% (21 Jul 2019 07:40) (95% - 98%)    PHYSICAL EXAM:  GENERAL:     NAD, well-groomed, well-developed  HEAD:     atraumatic, normocephalic  EYES:     EOMI, conjunctiva and sclera clear  ENMT:     no tonsillar erythema or exudates or enlargement, no oral lesions, moist mucous membranes, good dentition  NECK:     supple, no JVD  RESPIRATORY:     clear to auscultation bilaterally, no rales or rhonchi or wheezing or rubs  CARDIOVASCULAR:     regular rate and rhythm, no murmurs or rubs or gallops, 2+ peripheral pulses  GASTROINTESTINAL:     soft, nontender, nondistended, no hepatosplenomegaly palpated, bowel sounds present  EXTREMITIES:     no clubbing or cyanosis or edema  MUSCULOSKELETAL:     +left knee pain  NERVOUS SYSTEM:     motor strength intact with 5/5 B/L upper and lower extremities, no gross sensory deficits  SKIN:     no rashes or lesions  PSYCH:     appropriate, alert and orientated x3, good concentration    LABS:                        12.6   5.31  )-----------( 223      ( 21 Jul 2019 06:34 )             38.2     21 Jul 2019 06:34    141    |  105    |  16     ----------------------------<  106    5.1     |  35     |  0.70     Ca    8.9        21 Jul 2019 06:34          CAPILLARY BLOOD GLUCOSE          RADIOLOGY & ADDITIONAL TESTS:    Imaging Personally Reviewed:  [ ] YES  [ ] NO    Consultant(s) Notes Reviewed:  [X] YES  [ ] NO    Care Discussed with Consultants/Other Providers [X] YES  [ ] NO
NI LEYVA  [id number]    Pt is a 63y year old Female s/p left TKR. pain is 7/10. (+) Voids, tolerating regular diet. Denies chest pain/shortness of breath/nausea/vomitting. She notes that the pain medication is not helping enough    Vital Signs Last 24 Hrs  T(C): 36.5 (19 Jul 2019 07:17), Max: 37 (18 Jul 2019 19:30)  T(F): 97.7 (19 Jul 2019 07:17), Max: 98.6 (18 Jul 2019 19:30)  HR: 64 (19 Jul 2019 07:17) (56 - 77)  BP: 143/79 (19 Jul 2019 07:17) (110/66 - 143/79)  BP(mean): --  RR: 16 (19 Jul 2019 07:17) (15 - 18)  SpO2: 96% (19 Jul 2019 07:17) (95% - 98%)    I&O's Detail    18 Jul 2019 07:01  -  19 Jul 2019 07:00  --------------------------------------------------------  IN:  Total IN: 0 mL    OUT:    Accordian: 90 mL  Total OUT: 90 mL    Total NET: -90 mL                                12.5   9.30  )-----------( 228      ( 19 Jul 2019 07:13 )             38.2     07-18    141  |  106  |  9   ----------------------------<  126<H>  5.2   |  27  |  0.88    Ca    8.9      18 Jul 2019 07:18      no AM labs currently available    PE:   LLE: Dressing changed, incision clean and dry, no erythema or draininage, prineo intact SILT distally, (+2) DP/PT pulses, EHL/FHL/TA intact, Capillary refill < 2 seconds. negative calf tenderness PAS on, HV removed without complication    A: 63y year old Female s/p left TKR POD#2    Plan:   -DVT ppx = aspirin enteric coated 81 milliGRAM(s) Oral every 12 hours    -PT/OT = OOB  -Pain control - will increase pain meds  - patient instructed to wean off on pain meds in the coming days and to increase intervals between taking pain meds  -Medicine to follow   -continue to follow Labs  -continue use of PAS  -Dispo: [facility]
NI LEYVA  [id number]    Pt is a 63y year old Female s/p left TKR. pain is 3/10. (+) Voids, tolerating regular diet. Denies chest pain/shortness of breath/nausea/vomitting.     Vital Signs Last 24 Hrs  T(C): 36.7 (22 Jul 2019 07:33), Max: 36.7 (21 Jul 2019 15:12)  T(F): 98.1 (22 Jul 2019 07:33), Max: 98.1 (21 Jul 2019 23:20)  HR: 59 (22 Jul 2019 07:33) (59 - 67)  BP: 129/70 (22 Jul 2019 07:33) (102/53 - 136/75)  BP(mean): --  RR: 17 (22 Jul 2019 07:33) (16 - 17)  SpO2: 97% (22 Jul 2019 07:33) (97% - 97%)                              12.6   5.31  )-----------( 223      ( 21 Jul 2019 06:34 )             38.2     07-21    141  |  105  |  16  ----------------------------<  106<H>  5.1   |  35<H>  |  0.70    Ca    8.9      21 Jul 2019 06:34          PE:   LLE: Dressing changed, incision clean and dry, no erythema or draininage, prineo intact SILT distally, (+2) DP/PT pulses, EHL/FHL/TA intact, Capillary refill < 2 seconds. negative calf tenderness PAS on,  CPM:    A: 63y year old Female s/p left TKR POD#4    Plan:   -DVT ppx = apixaban 2.5 milliGRAM(s) Oral every 12 hours    -PT/OT = OOB  -Pain control   -Medicine to follow   -continue to follow Labs  -continue use of PAS  -Dispo: Home today
ORTHOPEDIC PA PROGRESS NOTE  NI GORDON      63y Female                                 SY 2WST 220 01                                                                                                                           POD #    3d    STATUS POST:       Procedure: Examination of knee with anesthesia and injection of cortisone  Left total knee replacement             Patient seen and examined at bedside.      Current Pain Management:    acetaminophen   Tablet .. 1000 milliGRAM(s) Oral every 8 hours  celecoxib 200 milliGRAM(s) Oral two times a day  HYDROmorphone  Injectable 0.5 milliGRAM(s) IV Push every 3 hours PRN  ondansetron Injectable 4 milliGRAM(s) IV Push every 6 hours PRN  oxyCODONE    IR 10 milliGRAM(s) Oral every 3 hours PRN  oxyCODONE    IR 15 milliGRAM(s) Oral every 3 hours PRN  venlafaxine 75 milliGRAM(s) Oral daily      T(F): 98  HR: 57  BP: 113/72  RR: 16  SpO2: 97%                         12.4   6.38  )-----------( 216      ( 20 Jul 2019 07:03 )             37.2         07-20    138  |  102  |  17  ----------------------------<  96  4.6   |  32<H>  |  0.81    Ca    8.5      20 Jul 2019 07:03      Physical Exam :    -   Dressing changed sterile.   -   Wound C/D/I.   -   Distal Neurvascular status intact grossly.   -   Warm well perfused; capillary refill <3 seconds   -   (+)EHL/FHL   -   (+) Sensation to light touch  -   (-) Calf tenderness Bilaterally      A/P: 63y Female s/p Examination of knee with anesthesia and injection of cortisone  Left total knee replacement     -   Ortho Stable  -   Pain control:  acetaminophen   Tablet .. 1000 milliGRAM(s) Oral every 8 hours  celecoxib 200 milliGRAM(s) Oral two times a day  HYDROmorphone  Injectable 0.5 milliGRAM(s) IV Push every 3 hours PRN  ondansetron Injectable 4 milliGRAM(s) IV Push every 6 hours PRN  oxyCODONE    IR 10 milliGRAM(s) Oral every 3 hours PRN  oxyCODONE    IR 15 milliGRAM(s) Oral every 3 hours PRN  venlafaxine 75 milliGRAM(s) Oral daily    -   Medicine to follow  -   DVT ppx:    PAS +  apixaban: 2.5 milliGRAM(s) Oral, apixaban: 2.5 milliGRAM(s) Oral  -   PT/OT OOB,  Weight bearing status: WBAT   -  Dispo:  [facility] Monday
ORTHOPEDIC PA PROGRESS NOTE  NI GORDON      63y Female                                 SY 2WST 220 01                                                                                                                           POD #    4d    STATUS POST:       Procedure: Examination of knee with anesthesia and injection of cortisone  Left total knee replacement             Patient seen and examined at bedside.      Current Pain Management:    acetaminophen   Tablet .. 1000 milliGRAM(s) Oral every 8 hours  celecoxib 200 milliGRAM(s) Oral two times a day  HYDROmorphone  Injectable 0.5 milliGRAM(s) IV Push every 3 hours PRN  ondansetron Injectable 4 milliGRAM(s) IV Push every 6 hours PRN  oxyCODONE    IR 10 milliGRAM(s) Oral every 3 hours PRN  oxyCODONE    IR 15 milliGRAM(s) Oral every 3 hours PRN  venlafaxine 75 milliGRAM(s) Oral daily      T(F): 97.7  HR: 59  BP: 148/74  RR: 16  SpO2: 98%                         12.6   5.31  )-----------( 223      ( 21 Jul 2019 06:34 )             38.2         07-21    141  |  105  |  16  ----------------------------<  106<H>  5.1   |  35<H>  |  0.70    Ca    8.9      21 Jul 2019 06:34      Physical Exam :    -   Dressing changed sterile.   -   Wound C/D/I.   -   Distal Neurvascular status intact grossly.   -   Warm well perfused; capillary refill <3 seconds   -   (+)EHL/FHL   -   (+) Sensation to light touch  -   (-) Calf tenderness Bilaterally      A/P: 63y Female s/p Examination of knee with anesthesia and injection of cortisone  Left total knee replacement     -   Ortho Stable  -   Pain control:  acetaminophen   Tablet .. 1000 milliGRAM(s) Oral every 8 hours  celecoxib 200 milliGRAM(s) Oral two times a day  HYDROmorphone  Injectable 0.5 milliGRAM(s) IV Push every 3 hours PRN  ondansetron Injectable 4 milliGRAM(s) IV Push every 6 hours PRN  oxyCODONE    IR 10 milliGRAM(s) Oral every 3 hours PRN  oxyCODONE    IR 15 milliGRAM(s) Oral every 3 hours PRN  venlafaxine 75 milliGRAM(s) Oral daily    -   Medicine to follow  -   DVT ppx:    PAS +  apixaban: 2.5 milliGRAM(s) Oral, apixaban: 2.5 milliGRAM(s) Oral  -   PT/OT OOB,  Weight bearing status: WBAT   -  Dispo:  changed from Rehab to home
Orthopaedic Post Op Note    Procedure: Left TKR  Surgeon: Lakshmi Palacio    63y Female comfortable, without complaints. Unable to feel or move LE's past thighs yet.  Reported pain score =0  Denies N/V, CP, SOB    PE:  Vital Signs Last 24 Hrs  T(C): 36.4 (17 Jul 2019 15:49), Max: 36.5 (17 Jul 2019 11:44)  T(F): 97.6 (17 Jul 2019 15:49), Max: 97.7 (17 Jul 2019 11:44)  HR: 60 (17 Jul 2019 17:19) (51 - 76)  BP: 117/46 (17 Jul 2019 17:19) (113/50 - 135/66)  RR: 16 (17 Jul 2019 17:19) (15 - 25)  SpO2: 94% (17 Jul 2019 17:19) (94% - 100%)  General: Pt alert and oriented   Lungs: + BS CTA bilaterally  Heart: +S1 & S2 heard, RRR  Abd: + BS heard, soft, NT, ND  Left Knee Dressing: C/D/I, functioning hemovac drain in place  Bilateral LEs:  Unable to assess sensorimotor exam 2' anesthetic block   2+ DP Pulses  SCDs in place    A/P: 63y Female POD#0 s/p Left TKR  - Stable  - Acetaminophen, Celebrex, Dilaudid/Oxycodone for Pain Control   - DVT ppx: Aspirin 81mg q 12h  - Alva op IV abx: Vancomycin  - PT, OT per protocol  - F/U AM Labs  DCP = home vs rehab Friday, pending progress with PT
POST OPERATIVE DAY #:  [1 ]   STATUS POST: [ x] Left [ ] Right  [x ]TKR [ ]THR                        Patient is a 63y old  Female who presents with a chief complaint of     Pain well controlled    OBJECTIVE:     Vital Signs Last 24 Hrs  T(C): 36.6 (18 Jul 2019 03:07), Max: 36.6 (17 Jul 2019 23:04)  T(F): 97.9 (18 Jul 2019 03:07), Max: 97.9 (17 Jul 2019 23:04)  HR: 56 (18 Jul 2019 03:07) (51 - 76)  BP: 93/50 (18 Jul 2019 03:07) (92/52 - 141/59)  BP(mean): --  RR: 16 (18 Jul 2019 03:07) (15 - 25)  SpO2: 93% (18 Jul 2019 03:07) (93% - 100%)    Affected extremity:          Dressing:  clean/dry/intact          Sensation; intact to light touch           Motor exam: Toes warm and mobile well perfused;  +capillary refill         No calf tenderness bilateral LE's    LABS:                        13.4   9.98  )-----------( 250      ( 18 Jul 2019 07:18 )             40.1     07-18    141  |  106  |  9   ----------------------------<  126<H>  5.2   |  27  |  0.88    Ca    8.9      18 Jul 2019 07:18              A/P :    -    Analgesics PRN  -    DVT ppx: [ x]ASA 81 bid [ ] Lovenox [ ] Coumadin   [ ] Eliquis   -    Weight bearing status: WBAT [x ]        PWB    [ ]     TTWB  [ ]      NWB  [ ]  -    Physical Therapy  -    Occupational Therapy  -    Dispo: Home [ ]     Rehab [ ]      [facility] [ ]      To be determined [x ]
Patient is a 63y old  Female who presents with a chief complaint of     INTERVAL HPI/OVERNIGHT EVENTS:  Pt is seen and examined.  Pain is controlled. sitting on a chair.    Pain Location & Control:     MEDICATIONS  (STANDING):  acetaminophen   Tablet .. 1000 milliGRAM(s) Oral every 8 hours  aspirin enteric coated 81 milliGRAM(s) Oral every 12 hours  celecoxib 200 milliGRAM(s) Oral two times a day  docusate sodium 100 milliGRAM(s) Oral three times a day  lactated ringers. 1000 milliLiter(s) (100 mL/Hr) IV Continuous <Continuous>  pantoprazole    Tablet 40 milliGRAM(s) Oral before breakfast  polyethylene glycol 3350 17 Gram(s) Oral daily  venlafaxine 75 milliGRAM(s) Oral daily    MEDICATIONS  (PRN):  aluminum hydroxide/magnesium hydroxide/simethicone Suspension 30 milliLiter(s) Oral four times a day PRN Indigestion  HYDROmorphone  Injectable 0.5 milliGRAM(s) IV Push every 3 hours PRN Severe Pain (7 - 10)  magnesium hydroxide Suspension 30 milliLiter(s) Oral daily PRN Constipation  ondansetron Injectable 4 milliGRAM(s) IV Push every 6 hours PRN Nausea and/or Vomiting  oxyCODONE    IR 5 milliGRAM(s) Oral every 3 hours PRN Mild Pain (1 - 3)  oxyCODONE    IR 10 milliGRAM(s) Oral every 3 hours PRN Moderate Pain (4 - 6)  senna 2 Tablet(s) Oral at bedtime PRN Constipation      Allergies    penicillin (Unknown)    Intolerances      Vital Signs Last 24 Hrs  T(C): 36.6 (18 Jul 2019 08:16), Max: 36.6 (17 Jul 2019 23:04)  T(F): 97.9 (18 Jul 2019 08:16), Max: 97.9 (17 Jul 2019 23:04)  HR: 59 (18 Jul 2019 08:16) (51 - 76)  BP: 129/69 (18 Jul 2019 08:16) (92/52 - 141/59)  BP(mean): --  RR: 16 (18 Jul 2019 08:16) (15 - 25)  SpO2: 98% (18 Jul 2019 08:16) (93% - 100%)        LABS:                        13.4   9.98  )-----------( 250      ( 18 Jul 2019 07:18 )             40.1     18 Jul 2019 07:18    141    |  106    |  9      ----------------------------<  126    5.2     |  27     |  0.88     Ca    8.9        18 Jul 2019 07:18        RADIOLOGY & ADDITIONAL TESTS:    Imaging Personally Reviewed:  [ ] YES  [ ] NO    Consultant(s) Notes Reviewed:  [ ] YES  [ ] NO    Care Discussed with Consultants/Other Providers [x ] YES  [ ] NO

## 2019-07-24 ENCOUNTER — CHART COPY (OUTPATIENT)
Age: 64
End: 2019-07-24

## 2019-08-01 ENCOUNTER — OUTPATIENT (OUTPATIENT)
Dept: OUTPATIENT SERVICES | Facility: HOSPITAL | Age: 64
LOS: 1 days | End: 2019-08-01
Payer: MEDICAID

## 2019-08-01 PROCEDURE — G9001: CPT

## 2019-08-02 ENCOUNTER — APPOINTMENT (OUTPATIENT)
Dept: ORTHOPEDIC SURGERY | Facility: CLINIC | Age: 64
End: 2019-08-02
Payer: MEDICAID

## 2019-08-02 VITALS
HEART RATE: 74 BPM | DIASTOLIC BLOOD PRESSURE: 78 MMHG | HEIGHT: 57.5 IN | WEIGHT: 130 LBS | BODY MASS INDEX: 27.66 KG/M2 | SYSTOLIC BLOOD PRESSURE: 123 MMHG

## 2019-08-02 PROCEDURE — 73562 X-RAY EXAM OF KNEE 3: CPT | Mod: LT

## 2019-08-02 PROCEDURE — 99024 POSTOP FOLLOW-UP VISIT: CPT

## 2019-08-05 ENCOUNTER — CHART COPY (OUTPATIENT)
Age: 64
End: 2019-08-05

## 2019-08-05 RX ORDER — OXYCODONE 5 MG/1
5 TABLET ORAL EVERY 4 HOURS
Qty: 60 | Refills: 0 | Status: ACTIVE | COMMUNITY
Start: 2019-08-05 | End: 1900-01-01

## 2019-08-06 ENCOUNTER — APPOINTMENT (OUTPATIENT)
Dept: ORTHOPEDIC SURGERY | Facility: HOSPITAL | Age: 64
End: 2019-08-06

## 2019-08-06 NOTE — HISTORY OF PRESENT ILLNESS
[___ Weeks Post Op] : [unfilled] weeks post op [2] : the patient reports pain that is 2/10 in severity [Clean/Dry/Intact] : clean, dry and intact [Healed] : healed [Neuro Intact] : an unremarkable neurological exam [Vascular Intact] : ~T peripheral vascular exam normal [Negative Corwni's] : maneuvers demonstrated a negative Corwin's sign [Doing Well] : is doing well [Excellent Pain Control] : has excellent pain control [No Sign of Infection] : is showing no signs of infection [Chills] : no chills [Diarrhea] : no diarrhea [Constipation] : no constipation [Dysuria] : no dysuria [Fever] : no fever [Erythema] : not erythematous [Vomiting] : no vomiting [Nausea] : no nausea [Discharge] : absent of discharge [Swelling] : not swollen [Dehiscence] : not dehisced [de-identified] : S/P left TKR, DOS: 07/17/2019.\par  [de-identified] : On physical examination of the left knee. Patient is status post left total knee replacement. There is a well-healed surgical scar with no evidence of infection superficial or deep. There is no redness swelling or heat noted. Cranial is in place. It was removed without incident. She has an nontender on examination. Overall alignment is within neutral position. Patient has excellent range of motion. She is able to fully extend the knee in flexion a 225° plus. His both passive actively. There is no evidence of ligamentous laxity. As this was tested in both anterior posterior drawer tests as well as varus and valgus stress testing. Patient has good distal pulses. No calf tenderness. No evidence of any motor or sensory deficit.\par \par In regards to the right knee. Patient presents with marked pain and tenderness noted in all 3 compartments. Femoral in the patellofemoral as well as the medial tibial compartment. The overall alignment is within neutral position. With decent range of motion. There is no evidence of any motor or sensory deficit. Patient has good distal pulses. [de-identified] : Patient is a 63-year-old female who presents today for an evaluation of her left knee. She is status post left total knee replacement on July 17, 2019. Overall she states she is doing very well with no complaints of any pain or discomfort. She states she is slowly returning back to her previous activities. I performed a good exercise program at home as well as outpatient therapy. She states that she does take some pain medication when needed and also uses a heating pad as needed. Overall she is doing very well with improving range of motion and ambulation status. She denies any history of recent injury. [de-identified] : X-rays of the left knee reveals status post left total knee replacement. The hardware is in place and shows excellent alignment as well as fixation. There is no evidence of any fracture dislocation or loosening of any hardware. [de-identified] : Status post left total knee y 17, 2019 [de-identified] : Plan for the patient is to continue with the present level of activities. This includes good home exercise program as well as outpatient physical therapy. Patient is advised to continue with ice moist heat along with pain medication if needed. Another prescription for oxycodone was a prescribed to her pharmacy without incident. She is advised to return back to the office in one month for reevaluation of motion. However she expresses an increase in pain or discomfort to notify the office.In regards to the right knee. Patient was explained that she does suffer from severe DJD of the right knee. She was explained the different modalities of treatment which include conservative management versus surgical intervention. However as she has had no success with conservative management. It is recommended that she undergo a right total knee replacement. She was explained the procedure in great detail as well as the risks benefits pros and cons of surgery. She was also splinted there is no guarantee for complete relief following this procedure. It was also explained about the postoperative management both in the [facility] and upon discharge. I reviewed her plan of care as well as a model of a total knee implant with the one that we used for the knee replacement. The patient agreed to the plan of care as well as the use of the implants for the total knee replacement. She is also advised to follow with the primary care physician for presurgical clearance and once deemed medically cleared and scheduled a date for surgery.

## 2019-08-09 DIAGNOSIS — Z71.89 OTHER SPECIFIED COUNSELING: ICD-10-CM

## 2019-08-15 ENCOUNTER — OUTPATIENT (OUTPATIENT)
Dept: OUTPATIENT SERVICES | Facility: HOSPITAL | Age: 64
LOS: 1 days | End: 2019-08-15
Payer: MEDICAID

## 2019-08-15 VITALS
HEIGHT: 59 IN | HEART RATE: 66 BPM | RESPIRATION RATE: 16 BRPM | OXYGEN SATURATION: 98 % | SYSTOLIC BLOOD PRESSURE: 122 MMHG | TEMPERATURE: 98 F | WEIGHT: 132.94 LBS | DIASTOLIC BLOOD PRESSURE: 70 MMHG

## 2019-08-15 DIAGNOSIS — M17.11 UNILATERAL PRIMARY OSTEOARTHRITIS, RIGHT KNEE: ICD-10-CM

## 2019-08-15 DIAGNOSIS — Z96.652 PRESENCE OF LEFT ARTIFICIAL KNEE JOINT: Chronic | ICD-10-CM

## 2019-08-15 DIAGNOSIS — Z01.818 ENCOUNTER FOR OTHER PREPROCEDURAL EXAMINATION: ICD-10-CM

## 2019-08-15 LAB
ALBUMIN SERPL ELPH-MCNC: 3.8 G/DL — SIGNIFICANT CHANGE UP (ref 3.3–5)
ALP SERPL-CCNC: 86 U/L — SIGNIFICANT CHANGE UP (ref 30–120)
ALT FLD-CCNC: 19 U/L DA — SIGNIFICANT CHANGE UP (ref 10–60)
ANION GAP SERPL CALC-SCNC: 6 MMOL/L — SIGNIFICANT CHANGE UP (ref 5–17)
APTT BLD: 32.9 SEC — SIGNIFICANT CHANGE UP (ref 28.5–37)
AST SERPL-CCNC: 16 U/L — SIGNIFICANT CHANGE UP (ref 10–40)
BILIRUB SERPL-MCNC: 0.3 MG/DL — SIGNIFICANT CHANGE UP (ref 0.2–1.2)
BLD GP AB SCN SERPL QL: SIGNIFICANT CHANGE UP
BUN SERPL-MCNC: 9 MG/DL — SIGNIFICANT CHANGE UP (ref 7–23)
CALCIUM SERPL-MCNC: 9.4 MG/DL — SIGNIFICANT CHANGE UP (ref 8.4–10.5)
CHLORIDE SERPL-SCNC: 102 MMOL/L — SIGNIFICANT CHANGE UP (ref 96–108)
CO2 SERPL-SCNC: 29 MMOL/L — SIGNIFICANT CHANGE UP (ref 22–31)
CREAT SERPL-MCNC: 0.85 MG/DL — SIGNIFICANT CHANGE UP (ref 0.5–1.3)
GLUCOSE SERPL-MCNC: 115 MG/DL — HIGH (ref 70–99)
HCT VFR BLD CALC: 39.5 % — SIGNIFICANT CHANGE UP (ref 34.5–45)
HGB BLD-MCNC: 12.6 G/DL — SIGNIFICANT CHANGE UP (ref 11.5–15.5)
INR BLD: 1 RATIO — SIGNIFICANT CHANGE UP (ref 0.88–1.16)
MCHC RBC-ENTMCNC: 30.5 PG — SIGNIFICANT CHANGE UP (ref 27–34)
MCHC RBC-ENTMCNC: 31.9 GM/DL — LOW (ref 32–36)
MCV RBC AUTO: 95.6 FL — SIGNIFICANT CHANGE UP (ref 80–100)
MRSA PCR RESULT.: SIGNIFICANT CHANGE UP
NRBC # BLD: 0 /100 WBCS — SIGNIFICANT CHANGE UP (ref 0–0)
PLATELET # BLD AUTO: 328 K/UL — SIGNIFICANT CHANGE UP (ref 150–400)
POTASSIUM SERPL-MCNC: 5.4 MMOL/L — HIGH (ref 3.5–5.3)
POTASSIUM SERPL-SCNC: 5.4 MMOL/L — HIGH (ref 3.5–5.3)
PROT SERPL-MCNC: 7.7 G/DL — SIGNIFICANT CHANGE UP (ref 6–8.3)
PROTHROM AB SERPL-ACNC: 10.9 SEC — SIGNIFICANT CHANGE UP (ref 10–12.9)
RBC # BLD: 4.13 M/UL — SIGNIFICANT CHANGE UP (ref 3.8–5.2)
RBC # FLD: 12.8 % — SIGNIFICANT CHANGE UP (ref 10.3–14.5)
S AUREUS DNA NOSE QL NAA+PROBE: SIGNIFICANT CHANGE UP
SODIUM SERPL-SCNC: 137 MMOL/L — SIGNIFICANT CHANGE UP (ref 135–145)
WBC # BLD: 3.56 K/UL — LOW (ref 3.8–10.5)
WBC # FLD AUTO: 3.56 K/UL — LOW (ref 3.8–10.5)

## 2019-08-15 PROCEDURE — G0463: CPT

## 2019-08-15 PROCEDURE — 86850 RBC ANTIBODY SCREEN: CPT

## 2019-08-15 PROCEDURE — 86901 BLOOD TYPING SEROLOGIC RH(D): CPT

## 2019-08-15 PROCEDURE — 85610 PROTHROMBIN TIME: CPT

## 2019-08-15 PROCEDURE — 85730 THROMBOPLASTIN TIME PARTIAL: CPT

## 2019-08-15 PROCEDURE — 93010 ELECTROCARDIOGRAM REPORT: CPT

## 2019-08-15 PROCEDURE — 36415 COLL VENOUS BLD VENIPUNCTURE: CPT

## 2019-08-15 PROCEDURE — 80053 COMPREHEN METABOLIC PANEL: CPT

## 2019-08-15 PROCEDURE — 87640 STAPH A DNA AMP PROBE: CPT

## 2019-08-15 PROCEDURE — 93005 ELECTROCARDIOGRAM TRACING: CPT

## 2019-08-15 PROCEDURE — 85027 COMPLETE CBC AUTOMATED: CPT

## 2019-08-15 PROCEDURE — 86900 BLOOD TYPING SEROLOGIC ABO: CPT

## 2019-08-15 NOTE — H&P PST ADULT - PSYCHIATRIC
negative Affect and characteristics of appearance, verbalizations, behaviors are appropriate detailed exam details…

## 2019-08-15 NOTE — H&P PST ADULT - ASSESSMENT
64 yo female is scheduled for left total knee replacement /right knee replacement on 7/17/19 with Dr Palacio on 7/17/19 at [facility]. 64 yo female is scheduled for right total knee replacement.  Pre op instructions reviewed and understood.

## 2019-08-15 NOTE — H&P PST ADULT - VENOUS THROMBOEMBOLISM CURRENT STATUS
(1) other risk factor (includes escalating BMI, pack-years of smoking, diabetes requiring insulin, chemotherapy, female gender and length of surgery) (0) indicator not present/(1) other risk factor (includes escalating BMI, pack-years of smoking, diabetes requiring insulin, chemotherapy, female gender and length of surgery)

## 2019-08-15 NOTE — H&P PST ADULT - MUSCULOSKELETAL COMMENTS
both knees, worse on left bilateral knees, worse on left left knee discomfort (s/p left TKR 3 weeks ago)

## 2019-08-15 NOTE — H&P PST ADULT - MUSCULOSKELETAL
details… detailed exam no joint warmth/no joint erythema/decreased ROM due to pain/no calf tenderness decreased ROM due to pain/joint swelling/no calf tenderness/diminished strength

## 2019-08-15 NOTE — H&P PST ADULT - NSICDXPROBLEM_GEN_ALL_CORE_FT
PROBLEM DIAGNOSES  Problem: Osteoarthritis of right knee  Assessment and Plan: Left total knee replacement /Right knee injection  Pending medical clearance  Instructions reviewed with patient and friend  best wishes offered    Problem: Osteoarthritis of left knee  Assessment and Plan: see above PROBLEM DIAGNOSES  Problem: Osteoarthritis of right knee  Assessment and Plan: RIGHT TOTAL KNEE REPLACEMENT  MEDICAL CLEARANCE  PRE OP INSTRUCTIONS

## 2019-08-15 NOTE — H&P PST ADULT - HISTORY OF PRESENT ILLNESS
64 yo female prsents with 5 year history of progressively increasing pain in right knee.  Pain increases with any ambulation and going up and down stairs. 62 yo female presents with 5 year history of progressively increasing pain in right knee.  Pain increases with any ambulation and when going up and down stairs.  Mild relief with Tylenol.

## 2019-08-28 ENCOUNTER — APPOINTMENT (OUTPATIENT)
Dept: ORTHOPEDIC SURGERY | Facility: CLINIC | Age: 64
End: 2019-08-28

## 2019-09-04 ENCOUNTER — INPATIENT (INPATIENT)
Facility: HOSPITAL | Age: 64
LOS: 1 days | Discharge: NURSING, REHAB OR LONG-TERM CARE | DRG: 470 | End: 2019-09-06
Attending: ORTHOPAEDIC SURGERY | Admitting: ORTHOPAEDIC SURGERY
Payer: MEDICAID

## 2019-09-04 ENCOUNTER — APPOINTMENT (OUTPATIENT)
Dept: ORTHOPEDIC SURGERY | Facility: HOSPITAL | Age: 64
End: 2019-09-04

## 2019-09-04 ENCOUNTER — RESULT REVIEW (OUTPATIENT)
Age: 64
End: 2019-09-04

## 2019-09-04 VITALS
TEMPERATURE: 98 F | DIASTOLIC BLOOD PRESSURE: 61 MMHG | WEIGHT: 129.63 LBS | RESPIRATION RATE: 14 BRPM | SYSTOLIC BLOOD PRESSURE: 149 MMHG | HEART RATE: 61 BPM | OXYGEN SATURATION: 100 % | HEIGHT: 57 IN

## 2019-09-04 DIAGNOSIS — Z96.652 PRESENCE OF LEFT ARTIFICIAL KNEE JOINT: Chronic | ICD-10-CM

## 2019-09-04 DIAGNOSIS — M17.11 UNILATERAL PRIMARY OSTEOARTHRITIS, RIGHT KNEE: ICD-10-CM

## 2019-09-04 LAB
ANION GAP SERPL CALC-SCNC: 10 MMOL/L — SIGNIFICANT CHANGE UP (ref 5–17)
BUN SERPL-MCNC: 11 MG/DL — SIGNIFICANT CHANGE UP (ref 7–23)
CALCIUM SERPL-MCNC: 8.9 MG/DL — SIGNIFICANT CHANGE UP (ref 8.4–10.5)
CHLORIDE SERPL-SCNC: 105 MMOL/L — SIGNIFICANT CHANGE UP (ref 96–108)
CO2 SERPL-SCNC: 27 MMOL/L — SIGNIFICANT CHANGE UP (ref 22–31)
CREAT SERPL-MCNC: 1.06 MG/DL — SIGNIFICANT CHANGE UP (ref 0.5–1.3)
GLUCOSE SERPL-MCNC: 206 MG/DL — HIGH (ref 70–99)
HCT VFR BLD CALC: 37.3 % — SIGNIFICANT CHANGE UP (ref 34.5–45)
HGB BLD-MCNC: 11.8 G/DL — SIGNIFICANT CHANGE UP (ref 11.5–15.5)
MCHC RBC-ENTMCNC: 30.5 PG — SIGNIFICANT CHANGE UP (ref 27–34)
MCHC RBC-ENTMCNC: 31.6 GM/DL — LOW (ref 32–36)
MCV RBC AUTO: 96.4 FL — SIGNIFICANT CHANGE UP (ref 80–100)
NRBC # BLD: 0 /100 WBCS — SIGNIFICANT CHANGE UP (ref 0–0)
PLATELET # BLD AUTO: 257 K/UL — SIGNIFICANT CHANGE UP (ref 150–400)
POTASSIUM SERPL-MCNC: 4.5 MMOL/L — SIGNIFICANT CHANGE UP (ref 3.5–5.3)
POTASSIUM SERPL-MCNC: 5 MMOL/L — SIGNIFICANT CHANGE UP (ref 3.5–5.3)
POTASSIUM SERPL-SCNC: 4.5 MMOL/L — SIGNIFICANT CHANGE UP (ref 3.5–5.3)
POTASSIUM SERPL-SCNC: 5 MMOL/L — SIGNIFICANT CHANGE UP (ref 3.5–5.3)
RBC # BLD: 3.87 M/UL — SIGNIFICANT CHANGE UP (ref 3.8–5.2)
RBC # FLD: 12.8 % — SIGNIFICANT CHANGE UP (ref 10.3–14.5)
SODIUM SERPL-SCNC: 142 MMOL/L — SIGNIFICANT CHANGE UP (ref 135–145)
WBC # BLD: 4.57 K/UL — SIGNIFICANT CHANGE UP (ref 3.8–10.5)
WBC # BLD: 7.5 K/UL — SIGNIFICANT CHANGE UP (ref 3.8–10.5)
WBC # FLD AUTO: 4.57 K/UL — SIGNIFICANT CHANGE UP (ref 3.8–10.5)
WBC # FLD AUTO: 7.5 K/UL — SIGNIFICANT CHANGE UP (ref 3.8–10.5)

## 2019-09-04 PROCEDURE — 88311 DECALCIFY TISSUE: CPT | Mod: 26

## 2019-09-04 PROCEDURE — 99223 1ST HOSP IP/OBS HIGH 75: CPT

## 2019-09-04 PROCEDURE — 88305 TISSUE EXAM BY PATHOLOGIST: CPT | Mod: 26

## 2019-09-04 PROCEDURE — 27447 TOTAL KNEE ARTHROPLASTY: CPT | Mod: 58,RT

## 2019-09-04 PROCEDURE — 73562 X-RAY EXAM OF KNEE 3: CPT | Mod: 26,RT

## 2019-09-04 RX ORDER — SODIUM CHLORIDE 9 MG/ML
1000 INJECTION, SOLUTION INTRAVENOUS
Refills: 0 | Status: DISCONTINUED | OUTPATIENT
Start: 2019-09-04 | End: 2019-09-06

## 2019-09-04 RX ORDER — ONDANSETRON 8 MG/1
4 TABLET, FILM COATED ORAL EVERY 6 HOURS
Refills: 0 | Status: DISCONTINUED | OUTPATIENT
Start: 2019-09-04 | End: 2019-09-06

## 2019-09-04 RX ORDER — CELECOXIB 200 MG/1
200 CAPSULE ORAL EVERY 12 HOURS
Refills: 0 | Status: DISCONTINUED | OUTPATIENT
Start: 2019-09-04 | End: 2019-09-06

## 2019-09-04 RX ORDER — POLYETHYLENE GLYCOL 3350 17 G/17G
17 POWDER, FOR SOLUTION ORAL DAILY
Refills: 0 | Status: DISCONTINUED | OUTPATIENT
Start: 2019-09-04 | End: 2019-09-06

## 2019-09-04 RX ORDER — TRANEXAMIC ACID 100 MG/ML
1000 INJECTION, SOLUTION INTRAVENOUS ONCE
Refills: 0 | Status: COMPLETED | OUTPATIENT
Start: 2019-09-04 | End: 2019-09-04

## 2019-09-04 RX ORDER — INFLUENZA VIRUS VACCINE 15; 15; 15; 15 UG/.5ML; UG/.5ML; UG/.5ML; UG/.5ML
0.5 SUSPENSION INTRAMUSCULAR ONCE
Refills: 0 | Status: COMPLETED | OUTPATIENT
Start: 2019-09-04 | End: 2019-09-04

## 2019-09-04 RX ORDER — ONDANSETRON 8 MG/1
4 TABLET, FILM COATED ORAL ONCE
Refills: 0 | Status: DISCONTINUED | OUTPATIENT
Start: 2019-09-04 | End: 2019-09-04

## 2019-09-04 RX ORDER — CHLORHEXIDINE GLUCONATE 213 G/1000ML
1 SOLUTION TOPICAL ONCE
Refills: 0 | Status: COMPLETED | OUTPATIENT
Start: 2019-09-04 | End: 2019-09-04

## 2019-09-04 RX ORDER — SODIUM CHLORIDE 9 MG/ML
1000 INJECTION, SOLUTION INTRAVENOUS
Refills: 0 | Status: DISCONTINUED | OUTPATIENT
Start: 2019-09-04 | End: 2019-09-04

## 2019-09-04 RX ORDER — PANTOPRAZOLE SODIUM 20 MG/1
40 TABLET, DELAYED RELEASE ORAL
Refills: 0 | Status: DISCONTINUED | OUTPATIENT
Start: 2019-09-04 | End: 2019-09-06

## 2019-09-04 RX ORDER — ASPIRIN/CALCIUM CARB/MAGNESIUM 324 MG
81 TABLET ORAL EVERY 12 HOURS
Refills: 0 | Status: DISCONTINUED | OUTPATIENT
Start: 2019-09-05 | End: 2019-09-06

## 2019-09-04 RX ORDER — VANCOMYCIN HCL 1 G
1000 VIAL (EA) INTRAVENOUS ONCE
Refills: 0 | Status: COMPLETED | OUTPATIENT
Start: 2019-09-05 | End: 2019-09-05

## 2019-09-04 RX ORDER — ACETAMINOPHEN 500 MG
1000 TABLET ORAL EVERY 6 HOURS
Refills: 0 | Status: COMPLETED | OUTPATIENT
Start: 2019-09-04 | End: 2019-09-05

## 2019-09-04 RX ORDER — MAGNESIUM HYDROXIDE 400 MG/1
30 TABLET, CHEWABLE ORAL DAILY
Refills: 0 | Status: DISCONTINUED | OUTPATIENT
Start: 2019-09-04 | End: 2019-09-06

## 2019-09-04 RX ORDER — VANCOMYCIN HCL 1 G
1000 VIAL (EA) INTRAVENOUS ONCE
Refills: 0 | Status: COMPLETED | OUTPATIENT
Start: 2019-09-04 | End: 2019-09-04

## 2019-09-04 RX ORDER — OXYCODONE HYDROCHLORIDE 5 MG/1
5 TABLET ORAL
Refills: 0 | Status: DISCONTINUED | OUTPATIENT
Start: 2019-09-04 | End: 2019-09-06

## 2019-09-04 RX ORDER — APREPITANT 80 MG/1
40 CAPSULE ORAL ONCE
Refills: 0 | Status: COMPLETED | OUTPATIENT
Start: 2019-09-04 | End: 2019-09-04

## 2019-09-04 RX ORDER — VENLAFAXINE HCL 75 MG
150 CAPSULE, EXT RELEASE 24 HR ORAL DAILY
Refills: 0 | Status: DISCONTINUED | OUTPATIENT
Start: 2019-09-04 | End: 2019-09-06

## 2019-09-04 RX ORDER — DOCUSATE SODIUM 100 MG
100 CAPSULE ORAL THREE TIMES A DAY
Refills: 0 | Status: DISCONTINUED | OUTPATIENT
Start: 2019-09-04 | End: 2019-09-06

## 2019-09-04 RX ORDER — OXYCODONE HYDROCHLORIDE 5 MG/1
10 TABLET ORAL
Refills: 0 | Status: DISCONTINUED | OUTPATIENT
Start: 2019-09-04 | End: 2019-09-06

## 2019-09-04 RX ORDER — ACETAMINOPHEN 500 MG
1000 TABLET ORAL ONCE
Refills: 0 | Status: COMPLETED | OUTPATIENT
Start: 2019-09-04 | End: 2019-09-04

## 2019-09-04 RX ORDER — HYDROMORPHONE HYDROCHLORIDE 2 MG/ML
0.5 INJECTION INTRAMUSCULAR; INTRAVENOUS; SUBCUTANEOUS
Refills: 0 | Status: DISCONTINUED | OUTPATIENT
Start: 2019-09-04 | End: 2019-09-04

## 2019-09-04 RX ORDER — SENNA PLUS 8.6 MG/1
2 TABLET ORAL AT BEDTIME
Refills: 0 | Status: DISCONTINUED | OUTPATIENT
Start: 2019-09-04 | End: 2019-09-06

## 2019-09-04 RX ORDER — ACETAMINOPHEN 500 MG
1000 TABLET ORAL EVERY 8 HOURS
Refills: 0 | Status: DISCONTINUED | OUTPATIENT
Start: 2019-09-05 | End: 2019-09-06

## 2019-09-04 RX ORDER — VENLAFAXINE HCL 75 MG
1 CAPSULE, EXT RELEASE 24 HR ORAL
Qty: 0 | Refills: 0 | DISCHARGE

## 2019-09-04 RX ORDER — HYDROMORPHONE HYDROCHLORIDE 2 MG/ML
0.5 INJECTION INTRAMUSCULAR; INTRAVENOUS; SUBCUTANEOUS
Refills: 0 | Status: DISCONTINUED | OUTPATIENT
Start: 2019-09-04 | End: 2019-09-06

## 2019-09-04 RX ADMIN — CELECOXIB 200 MILLIGRAM(S): 200 CAPSULE ORAL at 22:08

## 2019-09-04 RX ADMIN — CHLORHEXIDINE GLUCONATE 1 APPLICATION(S): 213 SOLUTION TOPICAL at 10:59

## 2019-09-04 RX ADMIN — Medication 150 MILLIGRAM(S): at 22:08

## 2019-09-04 RX ADMIN — Medication 1000 MILLIGRAM(S): at 22:08

## 2019-09-04 RX ADMIN — APREPITANT 40 MILLIGRAM(S): 80 CAPSULE ORAL at 10:59

## 2019-09-04 RX ADMIN — Medication 100 MILLIGRAM(S): at 22:08

## 2019-09-04 RX ADMIN — HYDROMORPHONE HYDROCHLORIDE 0.5 MILLIGRAM(S): 2 INJECTION INTRAMUSCULAR; INTRAVENOUS; SUBCUTANEOUS at 19:49

## 2019-09-04 RX ADMIN — Medication 250 MILLIGRAM(S): at 10:58

## 2019-09-04 RX ADMIN — CELECOXIB 200 MILLIGRAM(S): 200 CAPSULE ORAL at 22:11

## 2019-09-04 RX ADMIN — Medication 400 MILLIGRAM(S): at 22:08

## 2019-09-04 RX ADMIN — HYDROMORPHONE HYDROCHLORIDE 0.5 MILLIGRAM(S): 2 INJECTION INTRAMUSCULAR; INTRAVENOUS; SUBCUTANEOUS at 19:34

## 2019-09-04 RX ADMIN — SODIUM CHLORIDE 100 MILLILITER(S): 9 INJECTION, SOLUTION INTRAVENOUS at 16:28

## 2019-09-04 NOTE — CONSULT NOTE ADULT - ASSESSMENT
Aftercare following    pain meds dilaudid and oxycodone. Monitor for respiratory depression and lethargy.  PT/OT.  DVT prophylaxis.  DVT ppx: [x ]ASA81 [ ] [id number] [ ] Lovenox [ ] Coumadin   [ ] Eliquis [  ] Heparin  Dispo:     Home [ ]     Acute Rehab [ ]     [facility]IE [ ]     TBD [x ]      OA of both knees    Depression Aftercare following right TKR 9/4.    pain meds dilaudid and oxycodone. Monitor for respiratory depression and lethargy.  PT/OT.  DVT prophylaxis.  DVT ppx: [x ]ASA81 [ ] OMU206 [ ] Lovenox [ ] Coumadin   [ ] Eliquis [  ] Heparin  Dispo:     Home [ ]     Acute Rehab [ ]     ELLIE [ x]     TBD [ ]      OA of both knees    Depression  Plan of care was discuss with patient, all questions were answered, seems understand and in agreement.

## 2019-09-04 NOTE — PRE-OP CHECKLIST - ADDITIONAL CONSENTS
Patient would like to know if he is having an injection at his follow up visit on 5/17.  Patient states that the doctor told him it would be done at this visit.  Please call [phone]     Rep consent

## 2019-09-04 NOTE — CONSULT NOTE ADULT - SUBJECTIVE AND OBJECTIVE BOX
Patient is a 63y old  Female who presents with a chief complaint of   62 yo female presents with 5 year history of progressively increasing pain in right knee.  Pain increases with any ambulation and when going up and down stairs.  Mild relief with Tylenol.  HPI:  Patient is seen and examined.    PAST MEDICAL & SURGICAL HISTORY:  Depression  Osteoarthritis of right knee  Osteoarthritis of left knee  H/O total knee replacement, left: 6/2019      MEDICATIONS  (STANDING):  celecoxib 200 milliGRAM(s) Oral every 12 hours  influenza   Vaccine 0.5 milliLiter(s) IntraMuscular once  lactated ringers. 1000 milliLiter(s) (100 mL/Hr) IV Continuous <Continuous>  venlafaxine XR. 150 milliGRAM(s) Oral daily    MEDICATIONS  (PRN):  HYDROmorphone  Injectable 0.5 milliGRAM(s) IV Push every 3 hours PRN Severe Pain (7 - 10)  HYDROmorphone  Injectable 0.5 milliGRAM(s) IV Push every 10 minutes PRN Moderate Pain (4 - 6)  ondansetron Injectable 4 milliGRAM(s) IV Push once PRN Nausea and/or Vomiting  oxyCODONE    IR 5 milliGRAM(s) Oral every 3 hours PRN Mild Pain (1 - 3)  oxyCODONE    IR 10 milliGRAM(s) Oral every 3 hours PRN Moderate Pain (4 - 6)      Allergies    penicillin (Unknown)    Intolerances  SOCIAL HISTORY:  Smoker:  YES / NO        PACK YEARS:                         WHEN QUIT?  ETOH use:  YES / NO               FREQUENCY / QUANTITY:  Ilicit Drug use:  YES / NO  Occupation:  Assisted device use (Cane / Walker):  Live with:      FAMILY HISTORY:  No pertinent family history in first degree relatives      Vital Signs Last 24 Hrs  T(C): 36.9 (04 Sep 2019 16:00), Max: 36.9 (04 Sep 2019 16:00)  T(F): 98.4 (04 Sep 2019 16:00), Max: 98.4 (04 Sep 2019 16:00)  HR: 68 (04 Sep 2019 16:15) (61 - 75)  BP: 113/57 (04 Sep 2019 16:15) (113/57 - 149/61)  BP(mean): --  RR: 18 (04 Sep 2019 16:15) (14 - 18)  SpO2: 100% (04 Sep 2019 16:15) (100% - 100%)            LABS:                        x      4.57  )-----------( x        ( 04 Sep 2019 11:07 )             x        09-04    x   |  x   |  x   ----------------------------<  x   4.5   |  x   |  x             CAPILLARY BLOOD GLUCOSE          RADIOLOGY & ADDITIONAL STUDIES: Patient is a 63y old  Female who presents with a chief complaint of   64 yo female presents with 5 year history of progressively increasing pain in right knee.  Pain increases with any ambulation and when going up and down stairs.  Mild relief with Tylenol.  HPI:  Patient is seen and examined.  Pain is controlled.    PAST MEDICAL & SURGICAL HISTORY:  Depression  Osteoarthritis of right knee  Osteoarthritis of left knee  H/O total knee replacement, left: 6/2019      MEDICATIONS  (STANDING):  celecoxib 200 milliGRAM(s) Oral every 12 hours  influenza   Vaccine 0.5 milliLiter(s) IntraMuscular once  lactated ringers. 1000 milliLiter(s) (100 mL/Hr) IV Continuous <Continuous>  venlafaxine XR. 150 milliGRAM(s) Oral daily    MEDICATIONS  (PRN):  HYDROmorphone  Injectable 0.5 milliGRAM(s) IV Push every 3 hours PRN Severe Pain (7 - 10)  HYDROmorphone  Injectable 0.5 milliGRAM(s) IV Push every 10 minutes PRN Moderate Pain (4 - 6)  ondansetron Injectable 4 milliGRAM(s) IV Push once PRN Nausea and/or Vomiting  oxyCODONE    IR 5 milliGRAM(s) Oral every 3 hours PRN Mild Pain (1 - 3)  oxyCODONE    IR 10 milliGRAM(s) Oral every 3 hours PRN Moderate Pain (4 - 6)      Allergies    penicillin (Unknown)    Intolerances  SOCIAL HISTORY:  Smoker:   NO        PACK YEARS:                         WHEN QUIT?  ETOH use:   NO               FREQUENCY / QUANTITY:  Ilicit Drug use:   NO      FAMILY HISTORY:  No pertinent family history in first degree relatives      Vital Signs Last 24 Hrs  T(C): 36.9 (04 Sep 2019 16:00), Max: 36.9 (04 Sep 2019 16:00)  T(F): 98.4 (04 Sep 2019 16:00), Max: 98.4 (04 Sep 2019 16:00)  HR: 68 (04 Sep 2019 16:15) (61 - 75)  BP: 113/57 (04 Sep 2019 16:15) (113/57 - 149/61)  BP(mean): --  RR: 18 (04 Sep 2019 16:15) (14 - 18)  SpO2: 100% (04 Sep 2019 16:15) (100% - 100%)            LABS:                        x      4.57  )-----------( x        ( 04 Sep 2019 11:07 )             x        09-04    x   |  x   |  x   ----------------------------<  x   4.5   |  x   |  x

## 2019-09-05 ENCOUNTER — TRANSCRIPTION ENCOUNTER (OUTPATIENT)
Age: 64
End: 2019-09-05

## 2019-09-05 LAB
ANION GAP SERPL CALC-SCNC: 8 MMOL/L — SIGNIFICANT CHANGE UP (ref 5–17)
BUN SERPL-MCNC: 11 MG/DL — SIGNIFICANT CHANGE UP (ref 7–23)
CALCIUM SERPL-MCNC: 8.8 MG/DL — SIGNIFICANT CHANGE UP (ref 8.4–10.5)
CHLORIDE SERPL-SCNC: 104 MMOL/L — SIGNIFICANT CHANGE UP (ref 96–108)
CO2 SERPL-SCNC: 27 MMOL/L — SIGNIFICANT CHANGE UP (ref 22–31)
CREAT SERPL-MCNC: 0.73 MG/DL — SIGNIFICANT CHANGE UP (ref 0.5–1.3)
GLUCOSE SERPL-MCNC: 111 MG/DL — HIGH (ref 70–99)
HCT VFR BLD CALC: 33.1 % — LOW (ref 34.5–45)
HGB BLD-MCNC: 10.8 G/DL — LOW (ref 11.5–15.5)
MCHC RBC-ENTMCNC: 30.5 PG — SIGNIFICANT CHANGE UP (ref 27–34)
MCHC RBC-ENTMCNC: 32.6 GM/DL — SIGNIFICANT CHANGE UP (ref 32–36)
MCV RBC AUTO: 93.5 FL — SIGNIFICANT CHANGE UP (ref 80–100)
NRBC # BLD: 0 /100 WBCS — SIGNIFICANT CHANGE UP (ref 0–0)
PLATELET # BLD AUTO: 268 K/UL — SIGNIFICANT CHANGE UP (ref 150–400)
POTASSIUM SERPL-MCNC: 4.9 MMOL/L — SIGNIFICANT CHANGE UP (ref 3.5–5.3)
POTASSIUM SERPL-MCNC: 5.5 MMOL/L — HIGH (ref 3.5–5.3)
POTASSIUM SERPL-SCNC: 4.9 MMOL/L — SIGNIFICANT CHANGE UP (ref 3.5–5.3)
POTASSIUM SERPL-SCNC: 5.5 MMOL/L — HIGH (ref 3.5–5.3)
RBC # BLD: 3.54 M/UL — LOW (ref 3.8–5.2)
RBC # FLD: 12.4 % — SIGNIFICANT CHANGE UP (ref 10.3–14.5)
SODIUM SERPL-SCNC: 139 MMOL/L — SIGNIFICANT CHANGE UP (ref 135–145)
WBC # BLD: 9.65 K/UL — SIGNIFICANT CHANGE UP (ref 3.8–10.5)
WBC # FLD AUTO: 9.65 K/UL — SIGNIFICANT CHANGE UP (ref 3.8–10.5)

## 2019-09-05 PROCEDURE — 99233 SBSQ HOSP IP/OBS HIGH 50: CPT

## 2019-09-05 RX ADMIN — OXYCODONE HYDROCHLORIDE 10 MILLIGRAM(S): 5 TABLET ORAL at 09:50

## 2019-09-05 RX ADMIN — Medication 81 MILLIGRAM(S): at 06:14

## 2019-09-05 RX ADMIN — Medication 100 MILLIGRAM(S): at 06:14

## 2019-09-05 RX ADMIN — HYDROMORPHONE HYDROCHLORIDE 0.5 MILLIGRAM(S): 2 INJECTION INTRAMUSCULAR; INTRAVENOUS; SUBCUTANEOUS at 06:30

## 2019-09-05 RX ADMIN — CELECOXIB 200 MILLIGRAM(S): 200 CAPSULE ORAL at 21:29

## 2019-09-05 RX ADMIN — Medication 100 MILLIGRAM(S): at 21:28

## 2019-09-05 RX ADMIN — OXYCODONE HYDROCHLORIDE 10 MILLIGRAM(S): 5 TABLET ORAL at 14:30

## 2019-09-05 RX ADMIN — HYDROMORPHONE HYDROCHLORIDE 0.5 MILLIGRAM(S): 2 INJECTION INTRAMUSCULAR; INTRAVENOUS; SUBCUTANEOUS at 06:14

## 2019-09-05 RX ADMIN — Medication 1000 MILLIGRAM(S): at 14:31

## 2019-09-05 RX ADMIN — OXYCODONE HYDROCHLORIDE 10 MILLIGRAM(S): 5 TABLET ORAL at 08:58

## 2019-09-05 RX ADMIN — Medication 250 MILLIGRAM(S): at 01:48

## 2019-09-05 RX ADMIN — Medication 81 MILLIGRAM(S): at 17:53

## 2019-09-05 RX ADMIN — Medication 1000 MILLIGRAM(S): at 04:30

## 2019-09-05 RX ADMIN — SODIUM CHLORIDE 75 MILLILITER(S): 9 INJECTION, SOLUTION INTRAVENOUS at 01:51

## 2019-09-05 RX ADMIN — Medication 1000 MILLIGRAM(S): at 08:58

## 2019-09-05 RX ADMIN — Medication 400 MILLIGRAM(S): at 08:58

## 2019-09-05 RX ADMIN — OXYCODONE HYDROCHLORIDE 10 MILLIGRAM(S): 5 TABLET ORAL at 21:58

## 2019-09-05 RX ADMIN — OXYCODONE HYDROCHLORIDE 10 MILLIGRAM(S): 5 TABLET ORAL at 21:28

## 2019-09-05 RX ADMIN — Medication 100 MILLIGRAM(S): at 14:31

## 2019-09-05 RX ADMIN — HYDROMORPHONE HYDROCHLORIDE 0.5 MILLIGRAM(S): 2 INJECTION INTRAMUSCULAR; INTRAVENOUS; SUBCUTANEOUS at 16:00

## 2019-09-05 RX ADMIN — CELECOXIB 200 MILLIGRAM(S): 200 CAPSULE ORAL at 21:28

## 2019-09-05 RX ADMIN — OXYCODONE HYDROCHLORIDE 10 MILLIGRAM(S): 5 TABLET ORAL at 15:30

## 2019-09-05 RX ADMIN — CELECOXIB 200 MILLIGRAM(S): 200 CAPSULE ORAL at 08:57

## 2019-09-05 RX ADMIN — HYDROMORPHONE HYDROCHLORIDE 0.5 MILLIGRAM(S): 2 INJECTION INTRAMUSCULAR; INTRAVENOUS; SUBCUTANEOUS at 16:15

## 2019-09-05 RX ADMIN — CELECOXIB 200 MILLIGRAM(S): 200 CAPSULE ORAL at 08:58

## 2019-09-05 RX ADMIN — Medication 150 MILLIGRAM(S): at 14:31

## 2019-09-05 RX ADMIN — Medication 1000 MILLIGRAM(S): at 21:28

## 2019-09-05 RX ADMIN — PANTOPRAZOLE SODIUM 40 MILLIGRAM(S): 20 TABLET, DELAYED RELEASE ORAL at 06:14

## 2019-09-05 RX ADMIN — Medication 1000 MILLIGRAM(S): at 21:29

## 2019-09-05 RX ADMIN — Medication 400 MILLIGRAM(S): at 04:03

## 2019-09-05 NOTE — DISCHARGE NOTE PROVIDER - CARE PROVIDER_API CALL
Lakshmi Palacio)  Orthopedics  [address], [address]  [place] [address]  Phone: [phone]  Fax: [phone]  Follow Up Time:

## 2019-09-05 NOTE — OCCUPATIONAL THERAPY INITIAL EVALUATION ADULT - ADDITIONAL COMMENTS
rents room, has no help  has 4 LIDIA partial HR, and 13 steps 1 HR to room, + stall shower  Pt has a stall shower, SAC RW  As per pt After her first knee sx she could not manage the stairs I'ly and had to sleep on the floor

## 2019-09-05 NOTE — PROGRESS NOTE ADULT - SUBJECTIVE AND OBJECTIVE BOX
Patient is a 63y old  Female who presents with a chief complaint of     INTERVAL HPI/OVERNIGHT EVENTS:  Pt is seen and examined.  Pain is controlled.    Pain Location & Control:     MEDICATIONS  (STANDING):  acetaminophen   Tablet .. 1000 milliGRAM(s) Oral every 8 hours  aspirin enteric coated 81 milliGRAM(s) Oral every 12 hours  celecoxib 200 milliGRAM(s) Oral every 12 hours  docusate sodium 100 milliGRAM(s) Oral three times a day  influenza   Vaccine 0.5 milliLiter(s) IntraMuscular once  lactated ringers. 1000 milliLiter(s) (75 mL/Hr) IV Continuous <Continuous>  pantoprazole    Tablet 40 milliGRAM(s) Oral before breakfast  venlafaxine XR. 150 milliGRAM(s) Oral daily    MEDICATIONS  (PRN):  aluminum hydroxide/magnesium hydroxide/simethicone Suspension 30 milliLiter(s) Oral four times a day PRN Indigestion  HYDROmorphone  Injectable 0.5 milliGRAM(s) IV Push every 3 hours PRN Severe Pain (7 - 10)  magnesium hydroxide Suspension 30 milliLiter(s) Oral daily PRN Constipation  ondansetron Injectable 4 milliGRAM(s) IV Push every 6 hours PRN Nausea and/or Vomiting  oxyCODONE    IR 5 milliGRAM(s) Oral every 3 hours PRN Mild Pain (1 - 3)  oxyCODONE    IR 10 milliGRAM(s) Oral every 3 hours PRN Moderate Pain (4 - 6)  polyethylene glycol 3350 17 Gram(s) Oral daily PRN Constipation  senna 2 Tablet(s) Oral at bedtime PRN Constipation      Allergies    penicillin (Unknown)    Intolerances      Vital Signs Last 24 Hrs  T(C): 36.6 (05 Sep 2019 07:51), Max: 36.9 (04 Sep 2019 16:00)  T(F): 97.8 (05 Sep 2019 07:51), Max: 98.4 (04 Sep 2019 16:00)  HR: 62 (05 Sep 2019 07:51) (61 - 76)  BP: 107/63 (05 Sep 2019 07:51) (94/51 - 149/61)  BP(mean): --  RR: 18 (05 Sep 2019 07:51) (14 - 18)  SpO2: 96% (05 Sep 2019 07:51) (96% - 100%)        LABS:                        10.8   9.65  )-----------( 268      ( 05 Sep 2019 08:24 )             33.1     05 Sep 2019 08:22    139    |  104    |  11     ----------------------------<  111    5.5     |  27     |  0.73     Ca    8.8        05 Sep 2019 08:22        RADIOLOGY & ADDITIONAL TESTS:    Imaging Personally Reviewed:  [ ] YES  [ ] NO    Consultant(s) Notes Reviewed:  [ ] YES  [ ] NO    Care Discussed with Consultants/Other Providers [x ] YES  [ ] NO

## 2019-09-05 NOTE — OCCUPATIONAL THERAPY INITIAL EVALUATION ADULT - ANTICIPATED DISCHARGE DISPOSITION, OT EVAL
pt lives alone and reports after first sx she could not manage alone and slept on the floor/rehabilitation facility

## 2019-09-05 NOTE — DISCHARGE NOTE PROVIDER - NSDCCPCAREPLAN_GEN_ALL_CORE_FT
PRINCIPAL DISCHARGE DIAGNOSIS  Diagnosis: Primary osteoarthritis of right knee  Assessment and Plan of Treatment: Physical Therapy/Occupational Therapy for ambulation, transfers, stairs, ADL's, Range of Motion Exercises, Isometrics.  Full weight bearing as tolerated with rolling walker  Range of Motion Goals: Flexion 120 degrees; Extension 0 degrees  Keep incision clean and dry.  Suture/prineo dressing removal 14 days after surgery at rehab facility or Surgeon's office  May shower post-op day #5 if no drainage from incision

## 2019-09-05 NOTE — PROGRESS NOTE ADULT - SUBJECTIVE AND OBJECTIVE BOX
POST OPERATIVE DAY #: 1    63y Female  s/p   Right  TKA                SUBJECTIVE: Patient seen and examined at bedside.     Pain:  well controlled      Pain scale:   3/10  Denies CP, SOB, N/V/D, weakness, numbness   No new complains     OBJECTIVE:     Vital Signs Last 24 Hrs  T(C): 36.7 (05 Sep 2019 11:19), Max: 36.9 (04 Sep 2019 16:00)  T(F): 98 (05 Sep 2019 11:19), Max: 98.4 (04 Sep 2019 16:00)  HR: 73 (05 Sep 2019 11:19) (62 - 76)  BP: 146/72 (05 Sep 2019 11:19) (94/51 - 146/72)  BP(mean): --  RR: 18 (05 Sep 2019 11:19) (15 - 18)  SpO2: 98% (05 Sep 2019 11:19) (96% - 100%)    Affected extremity: RLE         Dressing: clean/dry/intact                     HV intact, on self suction         Sensation: intact to light touch          Motor exam:   5/ 5 Tibialis Anterior/Gastrocnemius-Soleus, EHL/FHL         warm, well-perfused; capillary refill < 3 seconds         negative calf tenderness B/L LE    LABS:                        10.8   9.65  )-----------( 268      ( 05 Sep 2019 08:24 )             33.1     09-05    139  |  104  |  11  ----------------------------<  111<H>  5.5<H>   |  27  |  0.73    Ca    8.8      05 Sep 2019 08:22        I&O's Detail    04 Sep 2019 07:01  -  05 Sep 2019 07:00  --------------------------------------------------------  IN:    lactated ringers.: 2050 mL    Oral Fluid: 500 mL  Total IN: 2550 mL    OUT:    Accordian: 380 mL    Voided: 400 mL  Total OUT: 780 mL    Total NET: 1770 mL          MEDICATIONS:    Pain management:  acetaminophen   Tablet .. 1000 milliGRAM(s) Oral every 8 hours  celecoxib 200 milliGRAM(s) Oral every 12 hours  HYDROmorphone  Injectable 0.5 milliGRAM(s) IV Push every 3 hours PRN  ondansetron Injectable 4 milliGRAM(s) IV Push every 6 hours PRN  oxyCODONE    IR 5 milliGRAM(s) Oral every 3 hours PRN  oxyCODONE    IR 10 milliGRAM(s) Oral every 3 hours PRN  venlafaxine XR. 150 milliGRAM(s) Oral daily    DVT prophylaxis:   aspirin enteric coated 81 milliGRAM(s) Oral every 12 hours      RADIOLOGY & ADDITIONAL STUDIES:    ASSESSMENT AND PLAN:     - Analgesic pain control  - DVT prophylaxis: ASA 81 mg twice a day   SCDs       - Pain Management: Celebrex 200mg twice a day x 21 days   - PT/OT: Weight Bearing Status:  Weight bearing as tolerated, OOBTC         -  Incentive spirometry  - IVF  - Advance diet as tolerated  - Hospitalist is following  - Follow up HV output  -  Follow up labs  -  Disposition: Subacute Rehab

## 2019-09-05 NOTE — PROGRESS NOTE ADULT - ASSESSMENT
Aftercare following right TKR 9/4.    pain meds dilaudid and oxycodone. Monitor for respiratory depression and lethargy.  PT/OT.  DVT prophylaxis.  DVT ppx: [x ]ASA81 [ ] BIY168 [ ] Lovenox [ ] Coumadin   [ ] Eliquis [  ] Heparin  Dispo:     Home [ ]     Acute Rehab [ ]     [facility] [ x]     TBD [ ]      OA of both knees    Depression    Post op anemia  likly due to acute blood loss.  asymptomatics.  trend cbc.    Hyperkalemia  check repeat S K level.    Plan of care was discuss with patient, all questions were answered, seems understand and in agreement.

## 2019-09-05 NOTE — OCCUPATIONAL THERAPY INITIAL EVALUATION ADULT - PATIENT/FAMILY/SIGNIFICANT OTHER GOALS STATEMENT, OT EVAL
go to rehab Pt states she lives alone and has a lot of stairs. After her first knee sx she could not manage the stairs I'ly and had to sleep on the floor

## 2019-09-05 NOTE — DISCHARGE NOTE PROVIDER - HOSPITAL COURSE
This patient was admitted to [facility] with a history of severe degenerative joint disease of the right knee.  Patient went to Pre-Surgical Testing at [facility] and was medically cleared to undergo elective procedure. Right TKR performed on 9/4/19 by Dr. Palacio. No operative or arsen-operative complications arose during patients hospital course.  Patient received antibiotic according to SCIP guidelines for infection prevention.  Ecotrin was given for DVT prophylaxis.  Anesthesia, Medical Hospitalist, Physical Therapy and Occupational Therapy were consulted. Patient is stable for discharge with a good prognosis.  Appropriate discharge instructions and medications are provided in this document.

## 2019-09-05 NOTE — DISCHARGE NOTE PROVIDER - NSDCFUSCHEDAPPT_GEN_ALL_CORE_FT
NI LEYVA ; 09/13/2019 ; NP [facility] [address]   NI LEYVA ; 09/20/2019 ; NP [facility] [address] [facility]  NI LEYVA ; 11/01/2019 ; [facility] [address] [facility] NI LEYVA ; 09/13/2019 ; NP [facility] [address]   NI LEYVA ; 09/20/2019 ; NP [facility] [address] [facility]  NI LEYVA ; 11/01/2019 ; [facility] [address] [facility]

## 2019-09-05 NOTE — DISCHARGE NOTE PROVIDER - NSDCACTIVITY_GEN_ALL_CORE
Stairs allowed/Do not make important decisions/Showering allowed/Sex allowed/Do not drive or operate machinery/No heavy lifting/straining

## 2019-09-06 ENCOUNTER — TRANSCRIPTION ENCOUNTER (OUTPATIENT)
Age: 64
End: 2019-09-06

## 2019-09-06 VITALS
HEART RATE: 62 BPM | TEMPERATURE: 98 F | DIASTOLIC BLOOD PRESSURE: 73 MMHG | RESPIRATION RATE: 16 BRPM | OXYGEN SATURATION: 97 % | SYSTOLIC BLOOD PRESSURE: 142 MMHG

## 2019-09-06 LAB
ANION GAP SERPL CALC-SCNC: 4 MMOL/L — LOW (ref 5–17)
BUN SERPL-MCNC: 14 MG/DL — SIGNIFICANT CHANGE UP (ref 7–23)
CALCIUM SERPL-MCNC: 8.3 MG/DL — LOW (ref 8.4–10.5)
CHLORIDE SERPL-SCNC: 103 MMOL/L — SIGNIFICANT CHANGE UP (ref 96–108)
CO2 SERPL-SCNC: 30 MMOL/L — SIGNIFICANT CHANGE UP (ref 22–31)
CREAT SERPL-MCNC: 0.71 MG/DL — SIGNIFICANT CHANGE UP (ref 0.5–1.3)
GLUCOSE SERPL-MCNC: 89 MG/DL — SIGNIFICANT CHANGE UP (ref 70–99)
HCT VFR BLD CALC: 30.6 % — LOW (ref 34.5–45)
HGB BLD-MCNC: 9.9 G/DL — LOW (ref 11.5–15.5)
MCHC RBC-ENTMCNC: 30.7 PG — SIGNIFICANT CHANGE UP (ref 27–34)
MCHC RBC-ENTMCNC: 32.4 GM/DL — SIGNIFICANT CHANGE UP (ref 32–36)
MCV RBC AUTO: 95 FL — SIGNIFICANT CHANGE UP (ref 80–100)
NRBC # BLD: 0 /100 WBCS — SIGNIFICANT CHANGE UP (ref 0–0)
PLATELET # BLD AUTO: 242 K/UL — SIGNIFICANT CHANGE UP (ref 150–400)
POTASSIUM SERPL-MCNC: 4.8 MMOL/L — SIGNIFICANT CHANGE UP (ref 3.5–5.3)
POTASSIUM SERPL-SCNC: 4.8 MMOL/L — SIGNIFICANT CHANGE UP (ref 3.5–5.3)
RBC # BLD: 3.22 M/UL — LOW (ref 3.8–5.2)
RBC # FLD: 12.8 % — SIGNIFICANT CHANGE UP (ref 10.3–14.5)
SODIUM SERPL-SCNC: 137 MMOL/L — SIGNIFICANT CHANGE UP (ref 135–145)
WBC # BLD: 6.98 K/UL — SIGNIFICANT CHANGE UP (ref 3.8–10.5)
WBC # FLD AUTO: 6.98 K/UL — SIGNIFICANT CHANGE UP (ref 3.8–10.5)

## 2019-09-06 PROCEDURE — 73562 X-RAY EXAM OF KNEE 3: CPT

## 2019-09-06 PROCEDURE — 85027 COMPLETE CBC AUTOMATED: CPT

## 2019-09-06 PROCEDURE — 88311 DECALCIFY TISSUE: CPT

## 2019-09-06 PROCEDURE — C1889: CPT

## 2019-09-06 PROCEDURE — 97535 SELF CARE MNGMENT TRAINING: CPT

## 2019-09-06 PROCEDURE — C1776: CPT

## 2019-09-06 PROCEDURE — 97110 THERAPEUTIC EXERCISES: CPT

## 2019-09-06 PROCEDURE — 85048 AUTOMATED LEUKOCYTE COUNT: CPT

## 2019-09-06 PROCEDURE — 88305 TISSUE EXAM BY PATHOLOGIST: CPT

## 2019-09-06 PROCEDURE — C1713: CPT

## 2019-09-06 PROCEDURE — 36415 COLL VENOUS BLD VENIPUNCTURE: CPT

## 2019-09-06 PROCEDURE — 99232 SBSQ HOSP IP/OBS MODERATE 35: CPT

## 2019-09-06 PROCEDURE — 97530 THERAPEUTIC ACTIVITIES: CPT

## 2019-09-06 PROCEDURE — 84132 ASSAY OF SERUM POTASSIUM: CPT

## 2019-09-06 PROCEDURE — 97161 PT EVAL LOW COMPLEX 20 MIN: CPT

## 2019-09-06 PROCEDURE — 97165 OT EVAL LOW COMPLEX 30 MIN: CPT

## 2019-09-06 PROCEDURE — 97116 GAIT TRAINING THERAPY: CPT

## 2019-09-06 PROCEDURE — 80048 BASIC METABOLIC PNL TOTAL CA: CPT

## 2019-09-06 RX ORDER — ACETAMINOPHEN 500 MG
2 TABLET ORAL
Qty: 0 | Refills: 0 | DISCHARGE
Start: 2019-09-06

## 2019-09-06 RX ORDER — PANTOPRAZOLE SODIUM 20 MG/1
1 TABLET, DELAYED RELEASE ORAL
Qty: 0 | Refills: 0 | DISCHARGE
Start: 2019-09-06

## 2019-09-06 RX ORDER — VENLAFAXINE HCL 75 MG
1 CAPSULE, EXT RELEASE 24 HR ORAL
Qty: 0 | Refills: 0 | DISCHARGE

## 2019-09-06 RX ORDER — SENNA PLUS 8.6 MG/1
2 TABLET ORAL
Qty: 0 | Refills: 0 | DISCHARGE
Start: 2019-09-06

## 2019-09-06 RX ORDER — OXYCODONE HYDROCHLORIDE 5 MG/1
1 TABLET ORAL
Qty: 0 | Refills: 0 | DISCHARGE
Start: 2019-09-06

## 2019-09-06 RX ORDER — ASPIRIN/CALCIUM CARB/MAGNESIUM 324 MG
1 TABLET ORAL
Qty: 0 | Refills: 0 | DISCHARGE
Start: 2019-09-06

## 2019-09-06 RX ORDER — DOCUSATE SODIUM 100 MG
1 CAPSULE ORAL
Qty: 0 | Refills: 0 | DISCHARGE
Start: 2019-09-06

## 2019-09-06 RX ORDER — CELECOXIB 200 MG/1
1 CAPSULE ORAL
Qty: 0 | Refills: 0 | DISCHARGE
Start: 2019-09-06

## 2019-09-06 RX ORDER — ACETAMINOPHEN WITH CODEINE 300MG-30MG
1 TABLET ORAL
Qty: 0 | Refills: 0 | DISCHARGE

## 2019-09-06 RX ADMIN — HYDROMORPHONE HYDROCHLORIDE 0.5 MILLIGRAM(S): 2 INJECTION INTRAMUSCULAR; INTRAVENOUS; SUBCUTANEOUS at 09:36

## 2019-09-06 RX ADMIN — OXYCODONE HYDROCHLORIDE 10 MILLIGRAM(S): 5 TABLET ORAL at 00:41

## 2019-09-06 RX ADMIN — OXYCODONE HYDROCHLORIDE 10 MILLIGRAM(S): 5 TABLET ORAL at 06:24

## 2019-09-06 RX ADMIN — CELECOXIB 200 MILLIGRAM(S): 200 CAPSULE ORAL at 08:30

## 2019-09-06 RX ADMIN — Medication 100 MILLIGRAM(S): at 05:54

## 2019-09-06 RX ADMIN — OXYCODONE HYDROCHLORIDE 10 MILLIGRAM(S): 5 TABLET ORAL at 16:16

## 2019-09-06 RX ADMIN — OXYCODONE HYDROCHLORIDE 10 MILLIGRAM(S): 5 TABLET ORAL at 15:39

## 2019-09-06 RX ADMIN — Medication 100 MILLIGRAM(S): at 13:29

## 2019-09-06 RX ADMIN — Medication 81 MILLIGRAM(S): at 17:25

## 2019-09-06 RX ADMIN — HYDROMORPHONE HYDROCHLORIDE 0.5 MILLIGRAM(S): 2 INJECTION INTRAMUSCULAR; INTRAVENOUS; SUBCUTANEOUS at 10:08

## 2019-09-06 RX ADMIN — Medication 150 MILLIGRAM(S): at 11:42

## 2019-09-06 RX ADMIN — Medication 1000 MILLIGRAM(S): at 05:54

## 2019-09-06 RX ADMIN — OXYCODONE HYDROCHLORIDE 10 MILLIGRAM(S): 5 TABLET ORAL at 01:11

## 2019-09-06 RX ADMIN — Medication 1000 MILLIGRAM(S): at 13:29

## 2019-09-06 RX ADMIN — OXYCODONE HYDROCHLORIDE 10 MILLIGRAM(S): 5 TABLET ORAL at 05:54

## 2019-09-06 RX ADMIN — CELECOXIB 200 MILLIGRAM(S): 200 CAPSULE ORAL at 08:02

## 2019-09-06 RX ADMIN — Medication 81 MILLIGRAM(S): at 05:54

## 2019-09-06 RX ADMIN — Medication 1000 MILLIGRAM(S): at 05:56

## 2019-09-06 RX ADMIN — Medication 1000 MILLIGRAM(S): at 14:15

## 2019-09-06 RX ADMIN — PANTOPRAZOLE SODIUM 40 MILLIGRAM(S): 20 TABLET, DELAYED RELEASE ORAL at 05:54

## 2019-09-06 NOTE — PROGRESS NOTE ADULT - ASSESSMENT
Aftercare following right TKR 9/4.    pain meds dilaudid and oxycodone. Monitor for respiratory depression and lethargy.  PT/OT.  DVT prophylaxis.  DVT ppx: [x ]ASA81 [ ] [id number] [ ] Lovenox [ ] Coumadin   [ ] Eliquis [  ] Heparin  Dispo:     Home [ ]     Acute Rehab [ ]     ELLIE [ x]     TBD [ ]      OA of both knees    Depression    Post op anemia  likly due to acute blood loss.  asymptomatics.  trend cbc.    Hyperkalemia  resolved.    Plan of care was discuss with patient, all questions were answered, seems understand and in agreement.

## 2019-09-06 NOTE — PROGRESS NOTE ADULT - SUBJECTIVE AND OBJECTIVE BOX
Discharge medication calendar:  Aspirin EC 81mg q12h x 6 weeks  APAP 1000mg q8h x 2-3 weeks  Celecoxib 200mg q12h x 2-3 weeks  Pantoprazole 40mg QAM x 6 weeks  Narcotic PRN  Docusate 100mg TID while taking narcotic  Miralax, Senna, or Bisacodyl PRN for treatment of constipation

## 2019-09-06 NOTE — PROGRESS NOTE ADULT - SUBJECTIVE AND OBJECTIVE BOX
Patient is a 63y old  Female who presents with a chief complaint of Right TKR (05 Sep 2019 12:52)      INTERVAL HPI/OVERNIGHT EVENTS:  Pt is seen and examined.  Pain is controlled.    Pain Location & Control:     MEDICATIONS  (STANDING):  acetaminophen   Tablet .. 1000 milliGRAM(s) Oral every 8 hours  aspirin enteric coated 81 milliGRAM(s) Oral every 12 hours  celecoxib 200 milliGRAM(s) Oral every 12 hours  docusate sodium 100 milliGRAM(s) Oral three times a day  influenza   Vaccine 0.5 milliLiter(s) IntraMuscular once  lactated ringers. 1000 milliLiter(s) (75 mL/Hr) IV Continuous <Continuous>  pantoprazole    Tablet 40 milliGRAM(s) Oral before breakfast  venlafaxine XR. 150 milliGRAM(s) Oral daily    MEDICATIONS  (PRN):  aluminum hydroxide/magnesium hydroxide/simethicone Suspension 30 milliLiter(s) Oral four times a day PRN Indigestion  bisacodyl Suppository 10 milliGRAM(s) Rectal daily PRN If no bowel movement by postoperative day #2  HYDROmorphone  Injectable 0.5 milliGRAM(s) IV Push every 3 hours PRN Severe Pain (7 - 10)  magnesium hydroxide Suspension 30 milliLiter(s) Oral daily PRN Constipation  ondansetron Injectable 4 milliGRAM(s) IV Push every 6 hours PRN Nausea and/or Vomiting  oxyCODONE    IR 5 milliGRAM(s) Oral every 3 hours PRN Mild Pain (1 - 3)  oxyCODONE    IR 10 milliGRAM(s) Oral every 3 hours PRN Moderate Pain (4 - 6)  polyethylene glycol 3350 17 Gram(s) Oral daily PRN Constipation  senna 2 Tablet(s) Oral at bedtime PRN Constipation      Allergies    penicillin (Unknown)    Intolerances      Vital Signs Last 24 Hrs  T(C): 36.8 (06 Sep 2019 07:55), Max: 36.9 (06 Sep 2019 03:19)  T(F): 98.2 (06 Sep 2019 07:55), Max: 98.4 (06 Sep 2019 03:19)  HR: 61 (06 Sep 2019 07:55) (55 - 74)  BP: 129/74 (06 Sep 2019 07:55) (103/64 - 146/72)  BP(mean): --  RR: 17 (06 Sep 2019 07:55) (16 - 18)  SpO2: 97% (06 Sep 2019 07:55) (95% - 98%)        LABS:                        9.9    6.98  )-----------( 242      ( 06 Sep 2019 08:19 )             30.6     06 Sep 2019 08:19    137    |  103    |  14     ----------------------------<  89     4.8     |  30     |  0.71     Ca    8.3        06 Sep 2019 08:19        RADIOLOGY & ADDITIONAL TESTS:    Imaging Personally Reviewed:  [ ] YES  [ ] NO    Consultant(s) Notes Reviewed:  [ ] YES  [ ] NO    Care Discussed with Consultants/Other Providers [x ] YES  [ ] NO

## 2019-09-06 NOTE — DISCHARGE NOTE NURSING/CASE MANAGEMENT/SOCIAL WORK - PATIENT PORTAL LINK FT
You can access the FollowMyHealth Patient Portal offered by [facility] by registering at the following website: [email or web address]. By joining [facility]’s FollowMyHealth portal, you will also be able to view your health information using other applications (apps) compatible with our system.

## 2019-09-06 NOTE — PROGRESS NOTE ADULT - SUBJECTIVE AND OBJECTIVE BOX
POD  #:  2  S/P  Right TKR                       SUBJECTIVE: Patient seen and examined. Feeling well.  Was a little nauseated this am, but better now.  Reported Pain Score = 3    OBJECTIVE:     Vital Signs Last 24 Hrs  T(C): 36.6 (06 Sep 2019 11:35), Max: 36.9 (06 Sep 2019 03:19)  T(F): 97.9 (06 Sep 2019 11:35), Max: 98.4 (06 Sep 2019 03:19)  HR: 67 (06 Sep 2019 11:35) (55 - 74)  BP: 121/72 (06 Sep 2019 11:35) (103/64 - 129/74)  RR: 17 (06 Sep 2019 11:35) (16 - 17)  SpO2: 99% (06 Sep 2019 11:35) (95% - 99%)    Right Knee:          Dressing removed: incision clean/dry/intact, prineo tape in place.  Hemovac drain in place = 60cc overnight  Bilateral LEs:         Sensation:  intact to light touch          Motor exam:  5/5 dorsiflexion/plantarflexion/EHL          2+ DP pulses          calf supple, NT         SCDs in place    LABS:                        9.9    6.98  )-----------( 242      ( 06 Sep 2019 08:19 )             30.6     09-06    137  |  103  |  14  ----------------------------<  89  4.8   |  30  |  0.71    Ca    8.3<L>      06 Sep 2019 08:19            MEDICATIONS:  Anticoagulation:  aspirin enteric coated 81 milliGRAM(s) Oral every 12 hours      Pain medications:   acetaminophen   Tablet .. 1000 milliGRAM(s) Oral every 8 hours  celecoxib 200 milliGRAM(s) Oral every 12 hours  HYDROmorphone  Injectable 0.5 milliGRAM(s) IV Push every 3 hours PRN  oxyCODONE    IR 5 milliGRAM(s) Oral every 3 hours PRN  oxyCODONE    IR 10 milliGRAM(s) Oral every 3 hours PRN      A/P : Patient stable  s/p Right TKR POD # 2  -    Pain control  -    DVT ppx: Aspirin 81mg q 12h  -    Weight bearing status: WBAT   -    Physical Therapy  -    Occupational Therapy  -    Discharge plan:  rehab when insurance authorized and bed available POD  #:  2  S/P  Right TKR                       SUBJECTIVE: Patient seen and examined. Feeling well.  Was a little nauseated this am, but better now.  Reported Pain Score = 3    OBJECTIVE:     Vital Signs Last 24 Hrs  T(C): 36.6 (06 Sep 2019 11:35), Max: 36.9 (06 Sep 2019 03:19)  T(F): 97.9 (06 Sep 2019 11:35), Max: 98.4 (06 Sep 2019 03:19)  HR: 67 (06 Sep 2019 11:35) (55 - 74)  BP: 121/72 (06 Sep 2019 11:35) (103/64 - 129/74)  RR: 17 (06 Sep 2019 11:35) (16 - 17)  SpO2: 99% (06 Sep 2019 11:35) (95% - 99%)    Right Knee:          Dressing removed: incision clean/dry/intact, prineo tape in place.  Hemovac drain in place = 60cc overnight  Bilateral LEs:         Sensation:  intact to light touch          Motor exam:  5/5 dorsiflexion/plantarflexion/EHL          2+ DP pulses          calf supple, NT         SCDs in place    LABS:                        9.9    6.98  )-----------( 242      ( 06 Sep 2019 08:19 )             30.6     09-06    137  |  103  |  14  ----------------------------<  89  4.8   |  30  |  0.71    Ca    8.3<L>      06 Sep 2019 08:19            MEDICATIONS:  Anticoagulation:  aspirin enteric coated 81 milliGRAM(s) Oral every 12 hours      Pain medications:   acetaminophen   Tablet .. 1000 milliGRAM(s) Oral every 8 hours  celecoxib 200 milliGRAM(s) Oral every 12 hours  HYDROmorphone  Injectable 0.5 milliGRAM(s) IV Push every 3 hours PRN  oxyCODONE    IR 5 milliGRAM(s) Oral every 3 hours PRN  oxyCODONE    IR 10 milliGRAM(s) Oral every 3 hours PRN      A/P : Patient stable  s/p Right TKR POD # 2  -    drain d/c'd, dressing changed. Dressing placed over drain site  -    Pain control  -    DVT ppx: Aspirin 81mg q 12h  -    Weight bearing status: WBAT   -    Physical Therapy  -    Occupational Therapy  -    Discharge plan:  rehab when insurance authorized and bed available

## 2019-09-09 ENCOUNTER — CHART COPY (OUTPATIENT)
Age: 64
End: 2019-09-09

## 2019-09-09 RX ORDER — OXYCODONE 5 MG/1
5 TABLET ORAL
Qty: 30 | Refills: 0 | Status: ACTIVE | COMMUNITY
Start: 2019-09-09 | End: 1900-01-01

## 2019-09-11 ENCOUNTER — CHART COPY (OUTPATIENT)
Age: 64
End: 2019-09-11

## 2019-09-13 ENCOUNTER — APPOINTMENT (OUTPATIENT)
Dept: ORTHOPEDIC SURGERY | Facility: CLINIC | Age: 64
End: 2019-09-13

## 2019-09-16 RX ORDER — CELECOXIB 200 MG/1
200 CAPSULE ORAL
Qty: 30 | Refills: 0 | Status: ACTIVE | COMMUNITY
Start: 2019-09-11 | End: 1900-01-01

## 2019-09-18 ENCOUNTER — CHART COPY (OUTPATIENT)
Age: 64
End: 2019-09-18

## 2019-09-19 ENCOUNTER — CHART COPY (OUTPATIENT)
Age: 64
End: 2019-09-19

## 2019-09-20 ENCOUNTER — APPOINTMENT (OUTPATIENT)
Dept: ORTHOPEDIC SURGERY | Facility: CLINIC | Age: 64
End: 2019-09-20
Payer: MEDICAID

## 2019-09-20 DIAGNOSIS — Z96.652 PRESENCE OF LEFT ARTIFICIAL KNEE JOINT: ICD-10-CM

## 2019-09-20 DIAGNOSIS — Z47.1 AFTERCARE FOLLOWING JOINT REPLACEMENT SURGERY: ICD-10-CM

## 2019-09-20 DIAGNOSIS — Z96.652 AFTERCARE FOLLOWING JOINT REPLACEMENT SURGERY: ICD-10-CM

## 2019-09-20 PROCEDURE — 73562 X-RAY EXAM OF KNEE 3: CPT | Mod: 50

## 2019-09-20 PROCEDURE — 99024 POSTOP FOLLOW-UP VISIT: CPT

## 2019-09-20 NOTE — HISTORY OF PRESENT ILLNESS
[de-identified] : right TKR 9/4/19\par S/P left TKR, DOS: 07/17/2019. [de-identified] : Patient presents today for evaluation of recent total knee arthroplasties. She is 2 weeks from her right knee. She is approximately 2 months from her left knee. She continues a home exercise program as well as attending physical therapy She is doing very well. She is no longer using a walker or cane. She is not taking any pain medicine. She is taking aspirin for DVT prophylaxis. She reports of good wound healing. No complications reported. She is looking forward to spending the winter in [place]. [de-identified] : Postop total knee replacement exam\par \par Exam of the right knee reveals that the patient is post knee replacement surgery.  The incision is clean, dry, and intact with prineo tape closure noted. No wound dehisence noted. There is no erythema.   There is mild effusion. No calf tenderness. No venous stasis or open skin wounds distally. No foot drop. Sensation is intact to light touch. \par \par 1- Alignment: measured on AP standing Xray Anatomic Alignment\par Neutral yes\par \par \par 2- Medial / Lateral Instability: measured in full extension \par None ollie\par \par 3- Anterior / Posterior Instability: measured at 90 degrees \par \par \par 4 - Range of motion is from zero to to 95 degrees. \par \par 5- Flexion Contracture no\par \par 6- Extensor Lag Minus Points no\par \par \par LEFT KNEE EXAM:\par \par 1- Alignment: measured on AP standing Xray Anatomic Alignment\par Neutral yes\par \par \par 2- Medial / Lateral Instability: measured in full extension \par None ollie\par \par 3- Anterior / Posterior Instability: measured at 90 degrees \par \par \par 4 - Range of motion is from zero to to 110 degrees. \par \par 5- Flexion Contracture no\par \par 6- Extensor Lag Minus Points no [de-identified] : X-rays of the knee is reviewed. Implants are in good position. No signs of loosening or fracture dislocation [de-identified] : Status post total knee arthroplasties right knee in 2 weeks and left knee in 2 months [de-identified] : X-rays reviewed the patient. The prineo tape was removed from the right knee. She is clinically doing well. She will continue with a home exercise program. She will continue outpatient physical therapy. The patient is content to [place] for the winter. She will likely leave next week. She'll return back in the spring for reevaluation when she returns from [place]. She will contact the office should she have any questions or concerns.

## 2019-09-24 ENCOUNTER — APPOINTMENT (OUTPATIENT)
Dept: ORTHOPEDIC SURGERY | Facility: CLINIC | Age: 64
End: 2019-09-24

## 2019-10-07 RX ORDER — CELECOXIB 200 MG/1
200 CAPSULE ORAL DAILY
Qty: 30 | Refills: 1 | Status: ACTIVE | COMMUNITY
Start: 2019-10-07 | End: 1900-01-01

## 2019-10-28 ENCOUNTER — APPOINTMENT (OUTPATIENT)
Dept: ORTHOPEDIC SURGERY | Facility: CLINIC | Age: 64
End: 2019-10-28

## 2019-11-01 ENCOUNTER — APPOINTMENT (OUTPATIENT)
Dept: ORTHOPEDIC SURGERY | Facility: CLINIC | Age: 64
End: 2019-11-01
Payer: MEDICARE

## 2019-11-01 DIAGNOSIS — Z47.1 AFTERCARE FOLLOWING JOINT REPLACEMENT SURGERY: ICD-10-CM

## 2019-11-01 DIAGNOSIS — Z96.651 PRESENCE OF RIGHT ARTIFICIAL KNEE JOINT: ICD-10-CM

## 2019-11-01 DIAGNOSIS — Z96.651 AFTERCARE FOLLOWING JOINT REPLACEMENT SURGERY: ICD-10-CM

## 2019-11-01 PROCEDURE — 99024 POSTOP FOLLOW-UP VISIT: CPT

## 2019-11-01 PROCEDURE — 73562 X-RAY EXAM OF KNEE 3: CPT | Mod: 26,RT

## 2019-11-01 NOTE — HISTORY OF PRESENT ILLNESS
[de-identified] : s/p right TKR 9/4/19 [de-identified] : Patient presents today for 2 month checkup of her right knee replacement. She is doing very well. She stimulated without a cane future ports of motion. She reports increased activity. She's not taking any pain medicine. She does not head atraumatic events of the knee. [de-identified] : Postop total knee replacement exam\par \par Exam of the right knee reveals that the patient is post knee replacement surgery.  The incision is clean, dry, and intact and well-healed. No wound dehisence noted. There is no erythema.   There is no effusion. No calf tenderness. No venous stasis or open skin wounds distally. No foot drop. Sensation is intact to light touch\par \par 1- Alignment: measured on AP standing Xray Anatomic Alignment\par Neutral yes\par \par 2- Medial / Lateral Instability: measured in full extension \par None yeess\par \par 3- Anterior / Posterior Instability: measured at 90 degrees \par None yes\par \par 4 - Range of motion is from zero to to 120 degrees. \par \par 5- Flexion Contracture no\par \par 6- Extensor Lag Minus Points no\par  [de-identified] : Right knee x-rays review. Implants are in good position. No signs of loosening or fracture [de-identified] : Status post right total knee arthroplasty 2 months [de-identified] : The patient will be seen back one year from time of surgery. The patient will continue to be active. The patient will continue to increase strength and perform home exercise program. The patient was reminded of dental prophylaxis. The patient will be seen sooner than one year should there be any concerns or complications. All questions were answered to the patient's satisfaction today.

## 2020-01-02 RX ORDER — CELECOXIB 200 MG/1
200 CAPSULE ORAL DAILY
Qty: 30 | Refills: 0 | Status: ACTIVE | COMMUNITY
Start: 2020-01-02 | End: 1900-01-01

## 2020-07-24 ENCOUNTER — APPOINTMENT (OUTPATIENT)
Dept: ORTHOPEDIC SURGERY | Facility: CLINIC | Age: 65
End: 2020-07-24
Payer: MEDICARE

## 2020-07-24 VITALS
HEIGHT: 57.5 IN | DIASTOLIC BLOOD PRESSURE: 78 MMHG | BODY MASS INDEX: 27.66 KG/M2 | WEIGHT: 130 LBS | HEART RATE: 69 BPM | SYSTOLIC BLOOD PRESSURE: 143 MMHG

## 2020-07-24 VITALS — TEMPERATURE: 96.8 F

## 2020-07-24 DIAGNOSIS — M20.11 HALLUX VALGUS (ACQUIRED), RIGHT FOOT: ICD-10-CM

## 2020-07-24 DIAGNOSIS — M21.611 BUNION OF RIGHT FOOT: ICD-10-CM

## 2020-07-24 DIAGNOSIS — M21.611 HALLUX VALGUS (ACQUIRED), RIGHT FOOT: ICD-10-CM

## 2020-07-24 PROCEDURE — 73630 X-RAY EXAM OF FOOT: CPT | Mod: RT

## 2020-07-24 PROCEDURE — 99214 OFFICE O/P EST MOD 30 MIN: CPT

## 2020-07-24 NOTE — DISCUSSION/SUMMARY
[de-identified] : Today's x-rays reviewed to patient. The patient is symptomatic from the hallux valgus deformity as well as the hammertoe deformity of her right foot. She would like to proceed with surgery. The proposed procedure this patient would be a first metatarsal osteotomy with bunionectomy and right second toe hammertoe correction this was extending to the patient in detail. She would like to proceed with surgery soon as possible. She will R. medical clearance. She was advised to be nonweightbearing on the foot for at least 6 weeks. She does understand this is an extended recovery as compared to her previous knee replacement surgeries. Risks and benefits of this procedure were discussed at great length. She will contact the office should she have any further questions. She'll be seen back the time of surgery.

## 2020-07-24 NOTE — PHYSICAL EXAM
[de-identified] : The patient appears well nourished and in no apparent distress. The patient is alert and oriented to person, place, and time. Affect and mood appear normal. The head is normocephalic and atraumatic. The eyes reveal normal sclera and extra ocular muscles are intact. The mucous membranes are moist. Skin shows normal turgor with no evidence of eczema or psoriasis. No respiratory distress noted. Sensation grossly intact. MUSCULOSKELETAL:   SEE BELOW\par \par Right foot exam demonstrates previous surgical scars over the dorsal distal first metatarsus as well as the dorsal second right toe. No signs of acute trauma. No erythema or ecchymosis. No bleeding. Severe hallux valgus deformity with moderate rigidity as a result of previous surgical intervention. There is currently no as those under riding of the second toe. The second toe is able to be straightened. There is some tenderness of the first MTP joint. Sensation of the great toe and second toe are diminished as compared to remainder the foot. 2+ DP pulse. No venostasis. No ulcerations. [de-identified] : X-rays of the right foot were reviewed. Degenerative changes of the first MTP joint are noted. Surgical correction of the second toe PIP joint is also noted. No retained hardware.

## 2020-07-27 DIAGNOSIS — Z01.818 ENCOUNTER FOR OTHER PREPROCEDURAL EXAMINATION: ICD-10-CM

## 2020-08-09 ENCOUNTER — TRANSCRIPTION ENCOUNTER (OUTPATIENT)
Age: 65
End: 2020-08-09

## 2020-08-13 VITALS
DIASTOLIC BLOOD PRESSURE: 80 MMHG | SYSTOLIC BLOOD PRESSURE: 143 MMHG | HEIGHT: 57.5 IN | TEMPERATURE: 98 F | HEART RATE: 65 BPM | OXYGEN SATURATION: 99 % | WEIGHT: 128.09 LBS | RESPIRATION RATE: 12 BRPM

## 2020-08-13 RX ORDER — VENLAFAXINE HCL 75 MG
1 CAPSULE, EXT RELEASE 24 HR ORAL
Qty: 0 | Refills: 0 | DISCHARGE

## 2020-08-13 NOTE — H&P PST ADULT - HISTORY OF PRESENT ILLNESS
this is a 65 y/o female who has had a bunion right foot with pain for years- to have right foot surgery; takes tylenol with codeine for pain

## 2020-08-13 NOTE — H&P PST ADULT - NSICDXPASTSURGICALHX_GEN_ALL_CORE_FT
PAST SURGICAL HISTORY:  H/O total knee replacement, left 6/2019    S/P appendectomy     S/P total knee replacement, right

## 2020-08-13 NOTE — H&P PST ADULT - NSICDXPASTMEDICALHX_GEN_ALL_CORE_FT
PAST MEDICAL HISTORY:  Bunion of right foot     Depression     Osteoarthritis of left knee     Osteoarthritis of right knee

## 2020-08-13 NOTE — H&P PST ADULT - NSICDXPROBLEM_GEN_ALL_CORE_FT
PROBLEM DIAGNOSES  Problem: Homelessness  Assessment and Plan: will have  contact patient    Problem: Bunion, right foot  Assessment and Plan: right foot 1st metatarsal osteotomy and bunionectomy and 2nd hammertoe correction; covid appt given, preop instructions given, had bloodwork; has medical clearance appt for 8/17/20

## 2020-08-17 ENCOUNTER — OUTPATIENT (OUTPATIENT)
Dept: OUTPATIENT SERVICES | Facility: HOSPITAL | Age: 65
LOS: 1 days | End: 2020-08-17
Payer: MEDICARE

## 2020-08-17 DIAGNOSIS — Z96.651 PRESENCE OF RIGHT ARTIFICIAL KNEE JOINT: Chronic | ICD-10-CM

## 2020-08-17 DIAGNOSIS — M20.40 OTHER HAMMER TOE(S) (ACQUIRED), UNSPECIFIED FOOT: ICD-10-CM

## 2020-08-17 DIAGNOSIS — Z90.49 ACQUIRED ABSENCE OF OTHER SPECIFIED PARTS OF DIGESTIVE TRACT: Chronic | ICD-10-CM

## 2020-08-17 DIAGNOSIS — Z59.0 HOMELESSNESS: ICD-10-CM

## 2020-08-17 DIAGNOSIS — Z96.652 PRESENCE OF LEFT ARTIFICIAL KNEE JOINT: Chronic | ICD-10-CM

## 2020-08-17 DIAGNOSIS — M20.11 HALLUX VALGUS (ACQUIRED), RIGHT FOOT: ICD-10-CM

## 2020-08-17 DIAGNOSIS — Z01.818 ENCOUNTER FOR OTHER PREPROCEDURAL EXAMINATION: ICD-10-CM

## 2020-08-17 DIAGNOSIS — M21.611 BUNION OF RIGHT FOOT: ICD-10-CM

## 2020-08-17 LAB — SARS-COV-2 RNA SPEC QL NAA+PROBE: SIGNIFICANT CHANGE UP

## 2020-08-17 PROCEDURE — U0003: CPT

## 2020-08-17 PROCEDURE — G0463: CPT

## 2020-08-17 SDOH — ECONOMIC STABILITY - HOUSING INSECURITY: HOMELESSNESS: Z59.0

## 2020-08-18 ENCOUNTER — TRANSCRIPTION ENCOUNTER (OUTPATIENT)
Age: 65
End: 2020-08-18

## 2020-08-19 ENCOUNTER — APPOINTMENT (OUTPATIENT)
Dept: ORTHOPEDIC SURGERY | Facility: HOSPITAL | Age: 65
End: 2020-08-19

## 2020-08-19 ENCOUNTER — RESULT REVIEW (OUTPATIENT)
Age: 65
End: 2020-08-19

## 2020-08-19 ENCOUNTER — OUTPATIENT (OUTPATIENT)
Dept: OUTPATIENT SERVICES | Facility: HOSPITAL | Age: 65
LOS: 1 days | End: 2020-08-19
Payer: MEDICARE

## 2020-08-19 VITALS
HEART RATE: 65 BPM | DIASTOLIC BLOOD PRESSURE: 80 MMHG | OXYGEN SATURATION: 99 % | WEIGHT: 126.99 LBS | HEIGHT: 57 IN | RESPIRATION RATE: 12 BRPM | TEMPERATURE: 98 F | SYSTOLIC BLOOD PRESSURE: 143 MMHG

## 2020-08-19 VITALS
RESPIRATION RATE: 20 BRPM | OXYGEN SATURATION: 97 % | SYSTOLIC BLOOD PRESSURE: 145 MMHG | DIASTOLIC BLOOD PRESSURE: 78 MMHG | HEART RATE: 61 BPM

## 2020-08-19 DIAGNOSIS — Z90.49 ACQUIRED ABSENCE OF OTHER SPECIFIED PARTS OF DIGESTIVE TRACT: Chronic | ICD-10-CM

## 2020-08-19 DIAGNOSIS — M20.11 HALLUX VALGUS (ACQUIRED), RIGHT FOOT: ICD-10-CM

## 2020-08-19 DIAGNOSIS — M20.40 OTHER HAMMER TOE(S) (ACQUIRED), UNSPECIFIED FOOT: ICD-10-CM

## 2020-08-19 DIAGNOSIS — Z96.652 PRESENCE OF LEFT ARTIFICIAL KNEE JOINT: Chronic | ICD-10-CM

## 2020-08-19 DIAGNOSIS — Z96.651 PRESENCE OF RIGHT ARTIFICIAL KNEE JOINT: Chronic | ICD-10-CM

## 2020-08-19 PROCEDURE — 28306 INCISION OF METATARSAL: CPT | Mod: RT

## 2020-08-19 PROCEDURE — C1713: CPT

## 2020-08-19 PROCEDURE — 88305 TISSUE EXAM BY PATHOLOGIST: CPT | Mod: 26

## 2020-08-19 PROCEDURE — 28296 COR HLX VLGS DSTL MTAR OSTEO: CPT | Mod: RT

## 2020-08-19 PROCEDURE — 88311 DECALCIFY TISSUE: CPT | Mod: 26

## 2020-08-19 PROCEDURE — 97161 PT EVAL LOW COMPLEX 20 MIN: CPT

## 2020-08-19 PROCEDURE — C1776: CPT

## 2020-08-19 PROCEDURE — 88305 TISSUE EXAM BY PATHOLOGIST: CPT

## 2020-08-19 PROCEDURE — 28285 REPAIR OF HAMMERTOE: CPT | Mod: T7

## 2020-08-19 PROCEDURE — 76000 FLUOROSCOPY <1 HR PHYS/QHP: CPT

## 2020-08-19 PROCEDURE — 28285 REPAIR OF HAMMERTOE: CPT | Mod: T6,T7

## 2020-08-19 PROCEDURE — 88311 DECALCIFY TISSUE: CPT

## 2020-08-19 RX ORDER — SODIUM CHLORIDE 9 MG/ML
1000 INJECTION, SOLUTION INTRAVENOUS
Refills: 0 | Status: DISCONTINUED | OUTPATIENT
Start: 2020-08-19 | End: 2020-08-20

## 2020-08-19 RX ORDER — CHLORHEXIDINE GLUCONATE 213 G/1000ML
1 SOLUTION TOPICAL ONCE
Refills: 0 | Status: COMPLETED | OUTPATIENT
Start: 2020-08-19 | End: 2020-08-19

## 2020-08-19 RX ORDER — ONDANSETRON 8 MG/1
4 TABLET, FILM COATED ORAL ONCE
Refills: 0 | Status: DISCONTINUED | OUTPATIENT
Start: 2020-08-19 | End: 2020-08-20

## 2020-08-19 RX ORDER — APREPITANT 80 MG/1
40 CAPSULE ORAL ONCE
Refills: 0 | Status: COMPLETED | OUTPATIENT
Start: 2020-08-19 | End: 2020-08-19

## 2020-08-19 RX ORDER — VENLAFAXINE HCL 75 MG
1 CAPSULE, EXT RELEASE 24 HR ORAL
Qty: 0 | Refills: 0 | DISCHARGE

## 2020-08-19 RX ORDER — OXYCODONE HYDROCHLORIDE 5 MG/1
1 TABLET ORAL
Qty: 20 | Refills: 0
Start: 2020-08-19

## 2020-08-19 RX ORDER — ACETAMINOPHEN WITH CODEINE 300MG-30MG
3 TABLET ORAL
Qty: 0 | Refills: 0 | DISCHARGE

## 2020-08-19 RX ORDER — HYDROMORPHONE HYDROCHLORIDE 2 MG/ML
0.5 INJECTION INTRAMUSCULAR; INTRAVENOUS; SUBCUTANEOUS
Refills: 0 | Status: DISCONTINUED | OUTPATIENT
Start: 2020-08-19 | End: 2020-08-20

## 2020-08-19 RX ADMIN — APREPITANT 40 MILLIGRAM(S): 80 CAPSULE ORAL at 11:16

## 2020-08-19 RX ADMIN — CHLORHEXIDINE GLUCONATE 1 APPLICATION(S): 213 SOLUTION TOPICAL at 11:01

## 2020-08-19 RX ADMIN — SODIUM CHLORIDE 75 MILLILITER(S): 9 INJECTION, SOLUTION INTRAVENOUS at 18:46

## 2020-08-19 NOTE — ASU DISCHARGE PLAN (ADULT/PEDIATRIC) - CALL YOUR DOCTOR IF YOU HAVE ANY OF THE FOLLOWING:
Bleeding that does not stop/Numbness, tingling, color or temperature change to extremity/Pain not relieved by Medications/Wound/Surgical Site with redness, or foul smelling discharge or pus/Fever greater than (need to indicate Fahrenheit or Celsius)

## 2020-08-19 NOTE — PHYSICAL THERAPY INITIAL EVALUATION ADULT - RANGE OF MOTION EXAMINATION, REHAB EVAL
bilateral upper extremity ROM was WNL (within normal limits)/Left LE ROM was WNL (within normal limits)/right ankle/foot not tested  due to surg

## 2020-08-19 NOTE — BRIEF OPERATIVE NOTE - NSICDXBRIEFPROCEDURE_GEN_ALL_CORE_FT
PROCEDURES:  Correction, hammer toe, 2 toes 19-Aug-2020 17:49:54  James Dixon  Bunionectomy, right 19-Aug-2020 17:49:41  James Dixon

## 2020-08-19 NOTE — BRIEF OPERATIVE NOTE - NSICDXBRIEFOPLAUNCH_GEN_ALL_CORE
PT RESTING IN BED. CHEERFUL. UP TO BR WITH WALKER. VOIDING WITHOUT DIFFICULTY.
PT REPORTS PAINFUL WHEN URINATING AT URETHRA SITE. SM RED AREA UNDER URETHRA.
URINE CLEAR YELLOW. ENC PT TO KEEP AREA CLEANA ND DRY. <--- Click to Launch ICDx for PreOp, PostOp and Procedure

## 2020-08-19 NOTE — PHYSICAL THERAPY INITIAL EVALUATION ADULT - CRITERIA FOR SKILLED THERAPEUTIC INTERVENTIONS
d/c home with ast and RW, ax crutch/impairments found/anticipated equipment needs at discharge/anticipated discharge recommendation

## 2020-08-19 NOTE — ASU DISCHARGE PLAN (ADULT/PEDIATRIC) - CARE PROVIDER_API CALL
Lakshmi Palacio)  Orthopedics  [address], [facility] [address]  [place] [address]  Phone: [phone]  Fax: [phone]  Follow Up Time:

## 2020-08-20 PROBLEM — M21.611 BUNION OF RIGHT FOOT: Chronic | Status: ACTIVE | Noted: 2020-08-13

## 2020-08-28 ENCOUNTER — APPOINTMENT (OUTPATIENT)
Dept: ORTHOPEDIC SURGERY | Facility: CLINIC | Age: 65
End: 2020-08-28
Payer: MEDICARE

## 2020-08-28 VITALS — TEMPERATURE: 96.1 F

## 2020-08-28 PROCEDURE — 99024 POSTOP FOLLOW-UP VISIT: CPT

## 2020-08-28 PROCEDURE — 73630 X-RAY EXAM OF FOOT: CPT | Mod: RT

## 2020-08-28 RX ORDER — OXYCODONE 5 MG/1
5 TABLET ORAL
Qty: 20 | Refills: 0 | Status: ACTIVE | COMMUNITY
Start: 2020-08-28 | End: 1900-01-01

## 2020-08-28 NOTE — HISTORY OF PRESENT ILLNESS
[de-identified] : failed bunion surgery of the right foot  [de-identified] : Right foot exam demonstrates surgical incisions of the medial distal foot along the first metatarsus as well as surgical sites of the dorsal second and third toes. The wounds are healing well. No wound dehiscence. Steri-Strips were removed. Small areas of tissue maceration are noted as a result of moisture. No erythema. No drainage. Sensation is intact to light touch. Her that surgical alignment of all sites is appreciated. [de-identified] : Patient presents today for initial postop visit following a right foot revision bunion surgery and second and third hammertoe deformity correction. She is 9 days from surgery. The patient states she is not currently using her postop shoe changes into a slipper because she was tripping. She states that she is only weightbearing through her heel. She has been washing the site. She reports the pain is controlled. No injuries to the surgical site. No wound complications. [de-identified] : X-rays of the right foot were reviewed. Implants are noted of the first metatarsus, proximal great toe phalanges as well as smart to devices of the second and third toes. No hardware failure. [de-identified] : X-rays reviewed with the patient. A new dry sterile dressing was applied. The patient with dressing intact for the next 5 days. After that she is permitted to remove the dressing and shower. The patient will continue to remain nonweightbearing in the forefoot. She is encouraged to use a postop shoe however, if she feels more comfortable not using it is acceptable as long as she does not weight-bear in the area of the surgical incisions. She will be seen back in 3 weeks' time for reevaluation with repeat x-ray. She will continue reduce her activity. She'll elevate her foot. All questions were answered to the patient's satisfaction. [de-identified] : Status post  right foot revision bunion surgery and second and third hammertoe deformity correction

## 2020-09-18 ENCOUNTER — APPOINTMENT (OUTPATIENT)
Dept: ORTHOPEDIC SURGERY | Facility: CLINIC | Age: 65
End: 2020-09-18
Payer: MEDICARE

## 2020-09-18 VITALS — TEMPERATURE: 96.7 F

## 2020-09-18 DIAGNOSIS — Z98.890 OTHER SPECIFIED POSTPROCEDURAL STATES: ICD-10-CM

## 2020-09-18 PROCEDURE — 73630 X-RAY EXAM OF FOOT: CPT | Mod: RT

## 2020-09-18 PROCEDURE — 99024 POSTOP FOLLOW-UP VISIT: CPT

## 2020-09-18 NOTE — HISTORY OF PRESENT ILLNESS
[de-identified] : failed bunion surgery of the right foot  [de-identified] : Patient presents today for 2nd  postop visit following a right foot revision bunion surgery and second and third hammertoe deformity correction. She is 4 weeks from surgery. The patient states she is not currently using her postop shoe and wearing a sneaker. She is minimal weight bearing on the area. She denies of any wound complications. She is not taking any pain medications.  [de-identified] : Right foot exam demonstrates well healed surgical incisions of the medial distal foot along the first metatarsus as well as surgical sites of the dorsal second and third toes. No wound dehiscence. No drainage. Sensation is intact to light touch. Surgical corrected alignment of all sites is appreciated. No tenderness found  [de-identified] : X-rays of the right foot were reviewed. Implants are noted of the first metatarsus, proximal great toe phalanges as well as smart to devices of the second and third toes. No hardware failure. [de-identified] : Status post  right foot revision bunion surgery and second and third hammertoe deformity correction @ 4 weeks [de-identified] : X-rays reviewed with the patient. The patient reports a trip and fall after trying to resume use of the postop shoe. She will not continue use of it. She states she will continue to minimally weight-bear in the sneaker. The patient will return back to the office in approximately 4 weeks' time for repeat x-ray. She will also likely be advanced in her activity level at that time. She is doing well. All questions answered to the patient's satisfaction.

## 2021-03-16 ENCOUNTER — TRANSCRIPTION ENCOUNTER (OUTPATIENT)
Age: 66
End: 2021-03-16

## 2021-03-26 ENCOUNTER — TRANSCRIPTION ENCOUNTER (OUTPATIENT)
Age: 66
End: 2021-03-26

## 2021-04-09 NOTE — H&P PST ADULT - SPO2 (%)
Goal Outcome Evaluation:  Plan of Care Reviewed With: patient  Progress: improving  Outcome Summary: patient agitated and uncooperative at beginning of shift, frequently climbing out of bed with bed alarm sounding, Ativan given x2, patient resting comfortably at this time, voiding per urinal, attempted to educated on seizure precautions   99

## 2021-04-23 ENCOUNTER — TRANSCRIPTION ENCOUNTER (OUTPATIENT)
Age: 66
End: 2021-04-23

## 2021-04-25 ENCOUNTER — TRANSCRIPTION ENCOUNTER (OUTPATIENT)
Age: 66
End: 2021-04-25

## 2023-02-27 NOTE — PHYSICAL THERAPY INITIAL EVALUATION ADULT - IMPAIRMENTS CONTRIBUTING TO GAIT DEVIATIONS, PT EVAL
decreased strength/decreased sensation/impaired balance/impaired motor control/decreased ROM Dutasteride Pregnancy And Lactation Text: This medication is absolutely contraindicated in women, especially during pregnancy and breast feeding. Feminization of male fetuses is possible if taking while pregnant.

## 2023-04-27 NOTE — H&P PST ADULT - NSANTHBMIRD_ENT_A_CORE
OUTPATIENT CASE CLOSURE DISCHARGE SUMMARY       Date of Admission: 3/21/2023 Date of Discharge: 4/27/2023 (Last day of program attendance 4/21/2023)       Chief Complaint: \"I don't want to start meds yet\"             ADMITTING DIAGNOSIS           DISCHARGE DIAGNOSIS    Major depressive disorder with current active episode, unspecified depression episode severity, unspecified whether recurrent  (primary encounter diagnosis)  Anxiety Major depressive disorder with current active episode, unspecified depression episode severity, unspecified whether recurrent  (primary encounter diagnosis)  Anxiety                 Patient was discharged from my care for the following reasons: Check melida one     []Met Goals     []Admitted to higher level of care     []Patient Decision    [x]Not attending/engaged     Other       Treatment Goal Progress toward Achievement  Problem: Depressive symptoms causing clinically significant distress or impairment in social, occupational, or other important areas of functioning           Long-Range Goal: Reduce symptoms of mood dysregulation that have impaired pt ability to function per the PHQ-9 and RILEY-7. Patient stated \"work on communication\"      Start Date: 3/21/2023 Expected End Date: 5/20/2023     Note:      3/21/2023 3:24 PM   Goal Progression: Outcome Not Met - Continue to monitor Initial FANNY Arce  4/6/2023 9:37 AM   Goal Progression: Outcome Not Met - Continue to monitor Update FANNY Arce  4/19/2023 2:49 PM   Goal Progression: Outcome Not Met - Continue to monitor Update FANNY Arce  4/27/2023 1:37 PM   Goal Progression: Outcome Not Met At Discharge - Placement/Referrals/Follow up Initiated Discharge FANNY Arce                    Intervention: Develop safety plan with patient on admission and update as needed  Completed 4/27/2023                 Short-Term Goal: Learn coping techniques to reduce depression      Start Date: 3/21/2023 Expected End Date: 4/20/2023     Note:       3/21/2023 3:24 PM   Goal Progression: Outcome Not Met - Continue to monitor Initial Yazmin, LCSW  4/6/2023 9:38 AM   Goal Progression: Outcome Not Met - Continue to monitor Update Yazmin LCSW  4/19/2023 2:49 PM   Goal Progression: Outcome Met - Continue evaluating goal progress toward completion  Update Yazmin, LCSW  4/27/2023 1:37 PM   Goal Progression: Outcome Met - Continue evaluating goal progress toward completion  Discharge FANNY Arce                    Intervention: CBT Challenging Negative Thoughts  Completed 4/27/2023            Intervention: Building Happiness  Completed 4/27/2023                       Treatment Summary (Low/Poor/Unsuccessful) ___ 1 ___2 __x_3 ___4___5 (High/Excellent/Successful)     Patient’s Overall Improvement on standardize measure: RILEY-7 score decreased from 8 on 3/23/2023 to 7 on 4/10/2023.    Patient’s Motivation Level (Poor, Fair, Good) Fair    Current Mental Status on DC (mood, affect, S.I) Stable mood, absence of SI, future-focused on last of program attendance    Aftercare Plans and Recommendation: AGUSTIN Saxena (medication management). Resources for outpatient therapy provided to parent.        No

## 2023-08-18 ENCOUNTER — APPOINTMENT (OUTPATIENT)
Dept: ORTHOPEDIC SURGERY | Facility: CLINIC | Age: 68
End: 2023-08-18
Payer: MEDICARE

## 2023-08-18 VITALS
SYSTOLIC BLOOD PRESSURE: 148 MMHG | BODY MASS INDEX: 27.4 KG/M2 | WEIGHT: 127 LBS | HEIGHT: 57 IN | HEART RATE: 63 BPM | DIASTOLIC BLOOD PRESSURE: 83 MMHG

## 2023-08-18 DIAGNOSIS — M21.612 BUNION OF LEFT FOOT: ICD-10-CM

## 2023-08-18 PROCEDURE — 73630 X-RAY EXAM OF FOOT: CPT | Mod: LT

## 2023-08-18 PROCEDURE — 99214 OFFICE O/P EST MOD 30 MIN: CPT

## 2023-08-18 NOTE — DISCUSSION/SUMMARY
[de-identified] : After thorough history full examination and review of the x-rays.  It was explained to the patient that she does have a hallux valgus deformity with bunions on the first MTP joint of the left foot.  She also has a hammertoe of the second and third toes.  She was explained the different modalities of treatment which include conservative versus surgical intervention.  However as she has had no great success with conservative management.  It is recommended that she undergo surgical intervention.  This would include a scarf osteotomy as well as a bunionectomy and hammertoe correction of the second and third toes.  She was explained the procedures in great detail as well as the risk benefits pros and cons of surgery as well as alternatives.  She was also explained that there is no guarantee for complete relief following this procedure.  She was explained the surgery in itself as well as shown a model depicting each and every step.  I was also discussed the postoperative management both in the hospital and upon discharge.  As well as the weightbearing status.  She was explained that there is a strong need for leg elevation following surgery.  As well as medications to control pain swelling as well as reduction of a possible DVT.  She was explained this all in great detail and if she does choose to undergo surgical intervention she would need to see her primary care physician for clearance.  I, Dr. Palacio, personally performed the evaluation and management services for this established patient who presents today with an orthopedic condition. The evaluation and management includes conducting the conditions and establishing a plan of care.  Today, my ACP [facility] Jones [facility], was here to assist with the patient in my evaluation and management services for this condition which will be followed going forward.  45 minutes were spent, face to face, in direct consultation with the patient. This includes reviewing the natural history of their Dx., eliciting the history, performing an orthopedic exam, review of the x-ray findings, forming a differential Dx and discussing all treatment options. This Includes both surgical and non-surgical treatments. I also reviewed all the risks and benefits of non-operative & operative Tx options, future impact into orthopedic functions/problems, activity restrictions both at home and at work, and all follow up requirements.

## 2023-08-18 NOTE — PHYSICAL EXAM
[Normal] : Gait: normal [LE] : Sensory: Intact in bilateral lower extremities [ALL] : dorsalis pedis, posterior tibial, femoral, popliteal, and radial 2+ and symmetric bilaterally [de-identified] : On physical examination of the left foot.  The skin is clean dry and intact with no areas of redness swelling or heat noted.  There is noted that the patient does have a hallux valgus deformity with bunion.  With some rigidity.  This is a decreased range of motion when flexing the and extension of the first MTP joint.  It is also noted that the patient does have a hammertoe deformity of both the second and third toes.  Which are reducible.  Overall she is neurovascular intact with no evidence of any motor or sensory deficit.  Patient has good distal pulses no calf tenderness. [de-identified] : X-rays of the left foot reveal hallux valgus deformity with bunion.  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  It is also noted on the lateral views that the patient does have a hammertoe of the second and third toes.

## 2023-08-18 NOTE — END OF VISIT
[Time Spent: ___ minutes] : I have spent [unfilled] minutes of time on the encounter. [FreeTextEntry3] : I, Dr. Lakshmi Palacio MD, personally performed the evaluation and management services for this established patient who presented today with a new problem/exacerbation of an existing condition. That E/M includes conducting the examination, assessing all new/exacerbated conditions, and establishing a new plan of care. Today, MY ACP was here to assist with recording the history in my evaluation and management services of this new problem/exacerbated condition to be followed going forward.

## 2023-08-18 NOTE — HISTORY OF PRESENT ILLNESS
[Stable] : stable [de-identified] : Patient is a 67-year-old female who presents today for an evaluation regarding her left foot.  She states that she has had some discomfort in her left foot for several years and is progressively gotten worse.  She states that she was seen here in the office in the past.  And underwent surgical intervention on her right foot.  But was also explained that she does have a hallux valgus with hammertoe deformity on the left.  She states that she tried conservative measures including proper shoe wear along with bracing and anti-inflammatories however this still persist and has worsened.  She denies any history of injury or accident.

## 2023-09-12 ENCOUNTER — APPOINTMENT (OUTPATIENT)
Dept: ORTHOPEDIC SURGERY | Facility: CLINIC | Age: 68
End: 2023-09-12
Payer: MEDICARE

## 2023-09-12 VITALS — HEART RATE: 68 BPM | SYSTOLIC BLOOD PRESSURE: 132 MMHG | DIASTOLIC BLOOD PRESSURE: 83 MMHG

## 2023-09-12 DIAGNOSIS — M25.511 PAIN IN RIGHT SHOULDER: ICD-10-CM

## 2023-09-12 PROCEDURE — 99214 OFFICE O/P EST MOD 30 MIN: CPT

## 2023-09-12 PROCEDURE — 73030 X-RAY EXAM OF SHOULDER: CPT | Mod: RT

## 2023-09-12 RX ORDER — MELOXICAM 15 MG/1
15 TABLET ORAL DAILY
Qty: 30 | Refills: 2 | Status: ACTIVE | COMMUNITY
Start: 2023-09-12 | End: 1900-01-01

## 2023-09-13 ENCOUNTER — APPOINTMENT (OUTPATIENT)
Dept: ORTHOPEDIC SURGERY | Facility: CLINIC | Age: 68
End: 2023-09-13
Payer: MEDICARE

## 2023-09-13 DIAGNOSIS — M79.644 PAIN IN RIGHT FINGER(S): ICD-10-CM

## 2023-09-13 DIAGNOSIS — M19.042 PRIMARY OSTEOARTHRITIS, LEFT HAND: ICD-10-CM

## 2023-09-13 DIAGNOSIS — M25.532 PAIN IN RIGHT WRIST: ICD-10-CM

## 2023-09-13 DIAGNOSIS — M79.641 PAIN IN RIGHT HAND: ICD-10-CM

## 2023-09-13 DIAGNOSIS — M18.12 UNILATERAL PRIMARY OSTEOARTHRITIS OF FIRST CARPOMETACARPAL JOINT, LEFT HAND: ICD-10-CM

## 2023-09-13 DIAGNOSIS — M25.531 PAIN IN RIGHT WRIST: ICD-10-CM

## 2023-09-13 DIAGNOSIS — M79.642 PAIN IN RIGHT HAND: ICD-10-CM

## 2023-09-13 PROCEDURE — 73110 X-RAY EXAM OF WRIST: CPT | Mod: 50

## 2023-09-13 PROCEDURE — 73130 X-RAY EXAM OF HAND: CPT | Mod: 50

## 2023-09-13 PROCEDURE — 20604 DRAIN/INJ JOINT/BURSA W/US: CPT | Mod: 50

## 2023-09-13 PROCEDURE — 99214 OFFICE O/P EST MOD 30 MIN: CPT | Mod: 25

## 2023-09-13 RX ORDER — METHYLPRED ACET/NACL,ISO-OS/PF 40 MG/ML
40 VIAL (ML) INJECTION
Qty: 1 | Refills: 0 | Status: COMPLETED | OUTPATIENT
Start: 2023-09-13

## 2023-09-13 RX ORDER — LIDOCAINE HYDROCHLORIDE 10 MG/ML
1 INJECTION, SOLUTION INFILTRATION; PERINEURAL
Refills: 0 | Status: COMPLETED | OUTPATIENT
Start: 2023-09-13

## 2023-09-13 RX ADMIN — Medication 0.5 %: at 00:00

## 2023-09-13 RX ADMIN — LIDOCAINE HYDROCHLORIDE 0.5 %: 10 INJECTION, SOLUTION INFILTRATION; PERINEURAL at 00:00

## 2023-09-13 RX ADMIN — METHYLPREDNISOLONE ACETATE 1 MG/ML: 40 INJECTION, SUSPENSION INTRA-ARTICULAR; INTRALESIONAL; INTRAMUSCULAR; SOFT TISSUE at 00:00

## 2023-09-19 ENCOUNTER — APPOINTMENT (OUTPATIENT)
Dept: ORTHOPEDIC SURGERY | Facility: CLINIC | Age: 68
End: 2023-09-19
Payer: MEDICARE

## 2023-09-19 VITALS — DIASTOLIC BLOOD PRESSURE: 82 MMHG | SYSTOLIC BLOOD PRESSURE: 140 MMHG | HEART RATE: 70 BPM

## 2023-09-19 PROCEDURE — 20610 DRAIN/INJ JOINT/BURSA W/O US: CPT | Mod: RT

## 2023-09-19 PROCEDURE — 99214 OFFICE O/P EST MOD 30 MIN: CPT | Mod: 25

## 2023-09-19 RX ORDER — METHYLPRED ACET/NACL,ISO-OS/PF 40 MG/ML
40 VIAL (ML) INJECTION
Qty: 1 | Refills: 0 | Status: COMPLETED | OUTPATIENT
Start: 2023-09-19

## 2023-09-19 RX ORDER — LIDOCAINE HYDROCHLORIDE 10 MG/ML
1 INJECTION, SOLUTION INFILTRATION; PERINEURAL
Refills: 0 | Status: COMPLETED | OUTPATIENT
Start: 2023-09-19

## 2023-09-19 RX ADMIN — METHYLPREDNISOLONE ACETATE MG/ML: 40 INJECTION, SUSPENSION INTRA-ARTICULAR; INTRALESIONAL; INTRAMUSCULAR; SOFT TISSUE at 00:00

## 2023-09-19 RX ADMIN — Medication %: at 00:00

## 2023-09-25 ENCOUNTER — APPOINTMENT (OUTPATIENT)
Dept: ORTHOPEDIC SURGERY | Facility: CLINIC | Age: 68
End: 2023-09-25
Payer: MEDICARE

## 2023-09-25 DIAGNOSIS — M75.20 BICIPITAL TENDINITIS, UNSPECIFIED SHOULDER: ICD-10-CM

## 2023-09-25 DIAGNOSIS — M75.100 UNSPECIFIED ROTATOR CUFF TEAR OR RUPTURE OF UNSPECIFIED SHOULDER, NOT SPECIFIED AS TRAUMATIC: ICD-10-CM

## 2023-09-25 PROCEDURE — 99212 OFFICE O/P EST SF 10 MIN: CPT | Mod: 95

## 2023-10-26 NOTE — PHYSICAL THERAPY INITIAL EVALUATION ADULT - PHYSICAL ASSIST/NONPHYSICAL ASSIST: SIT/STAND, REHAB EVAL
DISPLAY PLAN FREE TEXT DISPLAY PLAN FREE TEXT DISPLAY PLAN FREE TEXT DISPLAY PLAN FREE TEXT DISPLAY PLAN FREE TEXT DISPLAY PLAN FREE TEXT DISPLAY PLAN FREE TEXT DISPLAY PLAN FREE TEXT DISPLAY PLAN FREE TEXT DISPLAY PLAN FREE TEXT DISPLAY PLAN FREE TEXT DISPLAY PLAN FREE TEXT DISPLAY PLAN FREE TEXT DISPLAY PLAN FREE TEXT DISPLAY PLAN FREE TEXT DISPLAY PLAN FREE TEXT DISPLAY PLAN FREE TEXT DISPLAY PLAN FREE TEXT DISPLAY PLAN FREE TEXT DISPLAY PLAN FREE TEXT DISPLAY PLAN FREE TEXT 2 person assist

## 2023-11-02 NOTE — PHYSICAL THERAPY INITIAL EVALUATION ADULT - ASR WT BEARING STATUS EVAL
How Severe Are Your Spot(S)?: mild What Type Of Note Output Would You Prefer (Optional)?: Standard Output What Is The Reason For Today's Visit?: Full Body Skin Examination What Is The Reason For Today's Visit? (Being Monitored For X): concerning skin lesions on a periodic basis Right LE

## 2024-07-25 NOTE — OCCUPATIONAL THERAPY INITIAL EVALUATION ADULT - PREDICTED DURATION OF THERAPY (DAYS/WKS), OT EVAL
Current patient location: [address] [place] [id number]    Is the patient currently in the state of [place]? YES    Visit mode:VIDEO    If the visit is dropped, the patient can be reconnected by: VIDEO VISIT: Text to cell phone:   Telephone Information:   Mobile [phone]       Will anyone else be joining the visit? NO  (If patient encounters technical issues they should call [phone])    How would you like to obtain your AVS? MyChart    Are changes needed to the allergy or medication list? No, Pt stated no changes to allergies, and Pt stated no med changes    Are refills needed on medications prescribed by this physician? NO    Reason for visit: Consult    Funmi FORTE        
~ 3 days

## 2024-11-05 NOTE — PATIENT PROFILE ADULT - MONEY FOR FOOD
Patient does not appear to be in any acute distress/shows no evidence of clinical instability at this time.     Provider has reviewed discharge instructions with the patient/family.  The patient/family verbalized understanding instructions as well as need for follow up for any further symptoms.     Discharge papers given, education provided, and any questions answered. Patient discharged by provider.     no

## 2025-01-24 NOTE — PATIENT PROFILE ADULT - NSTRANSFEREYEGLASSESPAIRS_GEN_A_NUR
Interval History:     Patient seen at bedside. Reports no new symptoms. Continues to have SOB on exertion.   PT/OT consulted - plan for placement. Patient is pending placement.     Review of Systems  Objective:     Vital Signs (Most Recent):  Temp: 98.1 °F (36.7 °C) (01/24/25 1551)  Pulse: 69 (01/24/25 1551)  Resp: 20 (01/24/25 1551)  BP: (!) 103/56 (01/24/25 1551)  SpO2: 97 % (01/24/25 1551) Vital Signs (24h Range):  Temp:  [97.9 °F (36.6 °C)-98.1 °F (36.7 °C)] 98.1 °F (36.7 °C)  Pulse:  [32-69] 69  Resp:  [20] 20  SpO2:  [96 %-98 %] 97 %  BP: (103-170)/(56-78) 103/56     Weight: 72 kg (158 lb 11.7 oz)  Body mass index is 24.86 kg/m².    Intake/Output Summary (Last 24 hours) at 1/24/2025 1647  Last data filed at 1/24/2025 0708  Gross per 24 hour   Intake --   Output 1100 ml   Net -1100 ml         Physical Exam  Constitutional:       General: She is not in acute distress.     Appearance: She is not ill-appearing.   Cardiovascular:      Rate and Rhythm: Normal rate.      Heart sounds: Normal heart sounds.   Pulmonary:      Effort: Pulmonary effort is normal. No respiratory distress.      Breath sounds: No wheezing.   Abdominal:      General: Abdomen is flat.      Palpations: Abdomen is soft.      Tenderness: There is no abdominal tenderness.   Musculoskeletal:      Right lower leg: No edema.      Left lower leg: No edema.   Neurological:      General: No focal deficit present.      Mental Status: She is alert and oriented to person, place, and time. Mental status is at baseline.   Psychiatric:         Mood and Affect: Mood normal.             Significant Labs: All pertinent labs within the past 24 hours have been reviewed.  BMP:   Recent Labs   Lab 01/24/25  0938   GLU 95   *   K 4.1      CO2 22*   BUN 47*   CREATININE 2.3*   CALCIUM 10.1   MG 1.9     CBC:   Recent Labs   Lab 01/23/25  0416   WBC 5.12   HGB 9.2*   HCT 28.5*          Significant Imaging: I have reviewed all pertinent imaging  results/findings within the past 24 hours.   1 pair

## 2025-06-24 ENCOUNTER — APPOINTMENT (OUTPATIENT)
Dept: ORTHOPEDIC SURGERY | Facility: CLINIC | Age: 70
End: 2025-06-24

## 2025-08-15 ENCOUNTER — APPOINTMENT (OUTPATIENT)
Dept: ORTHOPEDIC SURGERY | Facility: CLINIC | Age: 70
End: 2025-08-15
Payer: MEDICARE

## 2025-08-15 VITALS
BODY MASS INDEX: 27.4 KG/M2 | WEIGHT: 127 LBS | SYSTOLIC BLOOD PRESSURE: 156 MMHG | DIASTOLIC BLOOD PRESSURE: 84 MMHG | HEIGHT: 57 IN | HEART RATE: 80 BPM

## 2025-08-15 DIAGNOSIS — M20.42 OTHER HAMMER TOE(S) (ACQUIRED), LEFT FOOT: ICD-10-CM

## 2025-08-15 DIAGNOSIS — M21.612 BUNION OF LEFT FOOT: ICD-10-CM

## 2025-08-15 DIAGNOSIS — M20.12 HALLUX VALGUS (ACQUIRED), LEFT FOOT: ICD-10-CM

## 2025-08-15 PROCEDURE — 99214 OFFICE O/P EST MOD 30 MIN: CPT

## 2025-08-15 PROCEDURE — 73630 X-RAY EXAM OF FOOT: CPT | Mod: LT

## 2025-08-22 ENCOUNTER — OUTPATIENT (OUTPATIENT)
Dept: OUTPATIENT SERVICES | Facility: HOSPITAL | Age: 70
LOS: 1 days | End: 2025-08-22
Payer: MEDICARE

## 2025-08-22 VITALS
RESPIRATION RATE: 18 BRPM | OXYGEN SATURATION: 98 % | WEIGHT: 123.46 LBS | HEIGHT: 56 IN | SYSTOLIC BLOOD PRESSURE: 146 MMHG | DIASTOLIC BLOOD PRESSURE: 78 MMHG | TEMPERATURE: 98 F | HEART RATE: 78 BPM

## 2025-08-22 DIAGNOSIS — M20.42 OTHER HAMMER TOE(S) (ACQUIRED), LEFT FOOT: ICD-10-CM

## 2025-08-22 DIAGNOSIS — Z90.49 ACQUIRED ABSENCE OF OTHER SPECIFIED PARTS OF DIGESTIVE TRACT: Chronic | ICD-10-CM

## 2025-08-22 DIAGNOSIS — Z98.890 OTHER SPECIFIED POSTPROCEDURAL STATES: Chronic | ICD-10-CM

## 2025-08-22 DIAGNOSIS — M20.12 HALLUX VALGUS (ACQUIRED), LEFT FOOT: ICD-10-CM

## 2025-08-22 DIAGNOSIS — Z96.651 PRESENCE OF RIGHT ARTIFICIAL KNEE JOINT: Chronic | ICD-10-CM

## 2025-08-22 DIAGNOSIS — Z01.818 ENCOUNTER FOR OTHER PREPROCEDURAL EXAMINATION: ICD-10-CM

## 2025-08-22 DIAGNOSIS — Z96.652 PRESENCE OF LEFT ARTIFICIAL KNEE JOINT: Chronic | ICD-10-CM

## 2025-08-22 LAB
ALBUMIN SERPL ELPH-MCNC: 4.2 G/DL — SIGNIFICANT CHANGE UP (ref 3.3–5)
ALP SERPL-CCNC: 96 U/L — SIGNIFICANT CHANGE UP (ref 30–120)
ALT FLD-CCNC: 25 U/L — SIGNIFICANT CHANGE UP (ref 10–60)
ANION GAP SERPL CALC-SCNC: 9 MMOL/L — SIGNIFICANT CHANGE UP (ref 5–17)
AST SERPL-CCNC: 16 U/L — SIGNIFICANT CHANGE UP (ref 10–40)
BILIRUB SERPL-MCNC: 0.4 MG/DL — SIGNIFICANT CHANGE UP (ref 0.2–1.2)
BUN SERPL-MCNC: 8 MG/DL — SIGNIFICANT CHANGE UP (ref 7–23)
CALCIUM SERPL-MCNC: 8.3 MG/DL — LOW (ref 8.4–10.5)
CHLORIDE SERPL-SCNC: 98 MMOL/L — SIGNIFICANT CHANGE UP (ref 96–108)
CO2 SERPL-SCNC: 27 MMOL/L — SIGNIFICANT CHANGE UP (ref 22–31)
CREAT SERPL-MCNC: 0.84 MG/DL — SIGNIFICANT CHANGE UP (ref 0.5–1.3)
EGFR: 75 ML/MIN/1.73M2 — SIGNIFICANT CHANGE UP
EGFR: 75 ML/MIN/1.73M2 — SIGNIFICANT CHANGE UP
GLUCOSE SERPL-MCNC: 97 MG/DL — SIGNIFICANT CHANGE UP (ref 70–99)
HCT VFR BLD CALC: 48 % — HIGH (ref 34.5–45)
HGB BLD-MCNC: 15.8 G/DL — HIGH (ref 11.5–15.5)
MCHC RBC-ENTMCNC: 30.2 PG — SIGNIFICANT CHANGE UP (ref 27–34)
MCHC RBC-ENTMCNC: 32.9 G/DL — SIGNIFICANT CHANGE UP (ref 32–36)
MCV RBC AUTO: 91.8 FL — SIGNIFICANT CHANGE UP (ref 80–100)
NRBC # BLD AUTO: 0 K/UL — SIGNIFICANT CHANGE UP (ref 0–0)
NRBC # FLD: 0 K/UL — SIGNIFICANT CHANGE UP (ref 0–0)
NRBC BLD AUTO-RTO: 0 /100 WBCS — SIGNIFICANT CHANGE UP (ref 0–0)
PLATELET # BLD AUTO: 276 K/UL — SIGNIFICANT CHANGE UP (ref 150–400)
PMV BLD: 9.1 FL — SIGNIFICANT CHANGE UP (ref 7–13)
POTASSIUM SERPL-MCNC: 4.7 MMOL/L — SIGNIFICANT CHANGE UP (ref 3.5–5.3)
POTASSIUM SERPL-SCNC: 4.7 MMOL/L — SIGNIFICANT CHANGE UP (ref 3.5–5.3)
PROT SERPL-MCNC: 7.9 G/DL — SIGNIFICANT CHANGE UP (ref 6–8.3)
RBC # BLD: 5.23 M/UL — HIGH (ref 3.8–5.2)
RBC # FLD: 12.8 % — SIGNIFICANT CHANGE UP (ref 10.3–14.5)
SODIUM SERPL-SCNC: 134 MMOL/L — LOW (ref 135–145)
WBC # BLD: 7.66 K/UL — SIGNIFICANT CHANGE UP (ref 3.8–10.5)
WBC # FLD AUTO: 7.66 K/UL — SIGNIFICANT CHANGE UP (ref 3.8–10.5)

## 2025-08-22 PROCEDURE — 85027 COMPLETE CBC AUTOMATED: CPT

## 2025-08-22 PROCEDURE — 93005 ELECTROCARDIOGRAM TRACING: CPT

## 2025-08-22 PROCEDURE — G0463: CPT

## 2025-08-22 PROCEDURE — 36415 COLL VENOUS BLD VENIPUNCTURE: CPT

## 2025-08-22 PROCEDURE — 93010 ELECTROCARDIOGRAM REPORT: CPT

## 2025-08-22 PROCEDURE — 80053 COMPREHEN METABOLIC PANEL: CPT

## 2025-09-04 ENCOUNTER — TRANSCRIPTION ENCOUNTER (OUTPATIENT)
Age: 70
End: 2025-09-04

## 2025-09-04 ENCOUNTER — APPOINTMENT (OUTPATIENT)
Dept: ORTHOPEDIC SURGERY | Facility: HOSPITAL | Age: 70
End: 2025-09-04

## 2025-09-04 ENCOUNTER — OUTPATIENT (OUTPATIENT)
Dept: OUTPATIENT SERVICES | Facility: HOSPITAL | Age: 70
LOS: 1 days | End: 2025-09-04
Payer: MEDICARE

## 2025-09-04 VITALS
RESPIRATION RATE: 20 BRPM | TEMPERATURE: 98 F | HEART RATE: 77 BPM | DIASTOLIC BLOOD PRESSURE: 124 MMHG | OXYGEN SATURATION: 100 % | WEIGHT: 114.2 LBS | HEIGHT: 56 IN | SYSTOLIC BLOOD PRESSURE: 150 MMHG

## 2025-09-04 VITALS
TEMPERATURE: 98 F | OXYGEN SATURATION: 97 % | SYSTOLIC BLOOD PRESSURE: 156 MMHG | RESPIRATION RATE: 18 BRPM | HEART RATE: 87 BPM | DIASTOLIC BLOOD PRESSURE: 82 MMHG

## 2025-09-04 DIAGNOSIS — Z98.890 OTHER SPECIFIED POSTPROCEDURAL STATES: Chronic | ICD-10-CM

## 2025-09-04 DIAGNOSIS — Z96.651 PRESENCE OF RIGHT ARTIFICIAL KNEE JOINT: Chronic | ICD-10-CM

## 2025-09-04 DIAGNOSIS — M20.42 OTHER HAMMER TOE(S) (ACQUIRED), LEFT FOOT: ICD-10-CM

## 2025-09-04 DIAGNOSIS — M20.12 HALLUX VALGUS (ACQUIRED), LEFT FOOT: ICD-10-CM

## 2025-09-04 DIAGNOSIS — Z90.49 ACQUIRED ABSENCE OF OTHER SPECIFIED PARTS OF DIGESTIVE TRACT: Chronic | ICD-10-CM

## 2025-09-04 DIAGNOSIS — Z96.652 PRESENCE OF LEFT ARTIFICIAL KNEE JOINT: Chronic | ICD-10-CM

## 2025-09-04 PROCEDURE — C1776: CPT

## 2025-09-04 PROCEDURE — 97161 PT EVAL LOW COMPLEX 20 MIN: CPT

## 2025-09-04 PROCEDURE — 28308 INCISION OF METATARSAL: CPT | Mod: LT,T1,T2,TA

## 2025-09-04 PROCEDURE — 28296 COR HLX VLGS DSTL MTAR OSTEO: CPT | Mod: AS,LT

## 2025-09-04 PROCEDURE — 76000 FLUOROSCOPY <1 HR PHYS/QHP: CPT

## 2025-09-04 PROCEDURE — 28299 COR HLX VLGS DOUBLE OSTEOT: CPT | Mod: TA

## 2025-09-04 PROCEDURE — C1769: CPT

## 2025-09-04 PROCEDURE — C1713: CPT

## 2025-09-04 PROCEDURE — 28309 INCISION OF METATARSALS: CPT | Mod: LT

## 2025-09-04 PROCEDURE — 28285 REPAIR OF HAMMERTOE: CPT | Mod: T1

## 2025-09-04 PROCEDURE — 28285 REPAIR OF HAMMERTOE: CPT | Mod: LT,T1,T2,T3,59

## 2025-09-04 DEVICE — IMPLANTABLE DEVICE: Type: IMPLANTABLE DEVICE | Site: LEFT | Status: FUNCTIONAL

## 2025-09-04 DEVICE — GUIDEWIRE UNTHREADED 1.4X150MM: Type: IMPLANTABLE DEVICE | Site: LEFT | Status: FUNCTIONAL

## 2025-09-04 DEVICE — K-WIRE STRYKER (SMOOTH) 4MM: Type: IMPLANTABLE DEVICE | Site: LEFT | Status: FUNCTIONAL

## 2025-09-04 DEVICE — SCREW SNAP OFF 2.0X14MM: Type: IMPLANTABLE DEVICE | Site: LEFT | Status: FUNCTIONAL

## 2025-09-04 DEVICE — K-WIRE STRYKER FIXOS (MARKED TIP) 0.9MM X 70MM: Type: IMPLANTABLE DEVICE | Site: LEFT | Status: FUNCTIONAL

## 2025-09-04 DEVICE — K-WIRE STRYKER (SMOOTH) 2.5MM: Type: IMPLANTABLE DEVICE | Site: LEFT | Status: FUNCTIONAL

## 2025-09-04 DEVICE — IMP SMART TOE ANG SZ 19 10DEG: Type: IMPLANTABLE DEVICE | Site: LEFT | Status: FUNCTIONAL

## 2025-09-04 RX ORDER — MELOXICAM 15 MG/1
1 TABLET ORAL
Refills: 0 | DISCHARGE

## 2025-09-04 RX ORDER — HYDROMORPHONE/SOD CHLOR,ISO/PF 2 MG/10 ML
0.2 SYRINGE (ML) INJECTION
Refills: 0 | Status: DISCONTINUED | OUTPATIENT
Start: 2025-09-04 | End: 2025-09-05

## 2025-09-04 RX ORDER — BUPROPION HYDROBROMIDE 522 MG/1
1 TABLET, EXTENDED RELEASE ORAL
Refills: 0 | DISCHARGE

## 2025-09-04 RX ORDER — OXYCODONE HYDROCHLORIDE 30 MG/1
5 TABLET ORAL ONCE
Refills: 0 | Status: DISCONTINUED | OUTPATIENT
Start: 2025-09-04 | End: 2025-09-05

## 2025-09-04 RX ORDER — ONDANSETRON HCL/PF 4 MG/2 ML
4 VIAL (ML) INJECTION ONCE
Refills: 0 | Status: DISCONTINUED | OUTPATIENT
Start: 2025-09-04 | End: 2025-09-05

## 2025-09-04 RX ORDER — HYDROMORPHONE/SOD CHLOR,ISO/PF 2 MG/10 ML
0.5 SYRINGE (ML) INJECTION
Refills: 0 | Status: DISCONTINUED | OUTPATIENT
Start: 2025-09-04 | End: 2025-09-05

## 2025-09-04 RX ORDER — SODIUM CHLORIDE 9 G/1000ML
1000 INJECTION, SOLUTION INTRAVENOUS
Refills: 0 | Status: DISCONTINUED | OUTPATIENT
Start: 2025-09-04 | End: 2025-09-05

## 2025-09-04 RX ORDER — OXYCODONE HYDROCHLORIDE 30 MG/1
1 TABLET ORAL
Qty: 42 | Refills: 0
Start: 2025-09-04 | End: 2025-09-10

## 2025-09-04 RX ORDER — ACETAMINOPHEN 500 MG/5ML
2 LIQUID (ML) ORAL
Refills: 0 | DISCHARGE

## 2025-09-04 RX ADMIN — Medication 1 APPLICATION(S): at 13:35

## 2025-09-04 RX ADMIN — SODIUM CHLORIDE 75 MILLILITER(S): 9 INJECTION, SOLUTION INTRAVENOUS at 20:27

## 2025-09-05 PROBLEM — M20.12 HALLUX VALGUS (ACQUIRED), LEFT FOOT: Chronic | Status: ACTIVE | Noted: 2025-08-22

## 2025-09-06 PROBLEM — Z87.19 PERSONAL HISTORY OF OTHER DISEASES OF THE DIGESTIVE SYSTEM: Chronic | Status: ACTIVE | Noted: 2025-08-22

## 2025-09-06 PROBLEM — M20.42 OTHER HAMMER TOE(S) (ACQUIRED), LEFT FOOT: Chronic | Status: ACTIVE | Noted: 2025-08-22

## 2025-09-11 ENCOUNTER — NON-APPOINTMENT (OUTPATIENT)
Age: 70
End: 2025-09-11

## 2025-09-19 ENCOUNTER — APPOINTMENT (OUTPATIENT)
Dept: ORTHOPEDIC SURGERY | Facility: CLINIC | Age: 70
End: 2025-09-19
Payer: MEDICARE

## 2025-09-19 PROCEDURE — 73630 X-RAY EXAM OF FOOT: CPT | Mod: LT

## 2025-09-19 PROCEDURE — 99024 POSTOP FOLLOW-UP VISIT: CPT

## (undated) DEVICE — DRILL BIT STRYKER 2.5MM

## (undated) DEVICE — SOLIDIFIER CANN EXPRESS 3K

## (undated) DEVICE — DRILL 2.5MM

## (undated) DEVICE — WARMING BLANKET UPPER ADULT

## (undated) DEVICE — SOL IRR POUR H2O 1500ML

## (undated) DEVICE — SUT MONOSOF 4-0 18" C-13

## (undated) DEVICE — DRAPE TOWEL BLUE 17" X 24"

## (undated) DEVICE — PACK LOWER EXTREMITY

## (undated) DEVICE — SAW BLADE STRYKER PRECISION THIN MEDIUM NARROW BLADE 5.5MM X 18MM

## (undated) DEVICE — REUSABLE GAUGE DEPTH 3MM GREEN

## (undated) DEVICE — SYM-STRYKER TPS CONSOLE: Type: DURABLE MEDICAL EQUIPMENT

## (undated) DEVICE — SOL IRR POUR NS 0.9% 500ML

## (undated) DEVICE — DRSG WEBRIL 4"

## (undated) DEVICE — SAW BLADE STRYKER SAGITTAL OSCILLATING 16.5X.38X34.5MM

## (undated) DEVICE — PREP CHLORAPREP HI-LITE ORANGE 26ML

## (undated) DEVICE — POSITIONER FOAM HEAD CRADLE (PINK)

## (undated) DEVICE — VENODYNE/SCD SLEEVE CALF MEDIUM

## (undated) DEVICE — Device

## (undated) DEVICE — DRILL BIT STRYKER ORTHO CANN 2.1X3MM

## (undated) DEVICE — ELCTR ROCKER SWITCH PENCIL BLUE 10FT

## (undated) DEVICE — SAW BLADE STRYKER PRECISION THIN 18.5MM X 16.5MM

## (undated) DEVICE — SUT VICRYL PLUS 3-0 18" PS-2 UNDYED

## (undated) DEVICE — SAW BLADE STRYKER SAGITTAL OSCILLATING 9X31X0.51MM

## (undated) DEVICE — DRAPE C ARM UNIVERSAL

## (undated) DEVICE — CANISTER SUCTION LINER 1500 CC

## (undated) DEVICE — SAW BLADE STRYKER PRECISION THIN TYPE 9 X 0.38 X 18.5MM

## (undated) DEVICE — DRILL BIT STRYKER ORTHO CANN 1.9MM

## (undated) DEVICE — TOURNIQUET ESMARK 4"

## (undated) DEVICE — GAUGE DEPTH YLLW 2.5MM

## (undated) DEVICE — CANISTER SUCTION LID GUARD 3000CC

## (undated) DEVICE — SAW BLADE STRYKER PRECISION THIN STRAIGHT FINE 9MM X 18.5MM

## (undated) DEVICE — DRILL 2.0MM

## (undated) DEVICE — POSITIONER STRAP ARMBOARD VELCRO TS-30

## (undated) DEVICE — SUT VICRYL PLUS 1 27" OS-4 UNDYED